# Patient Record
Sex: FEMALE | Race: WHITE | Employment: OTHER | URBAN - NONMETROPOLITAN AREA
[De-identification: names, ages, dates, MRNs, and addresses within clinical notes are randomized per-mention and may not be internally consistent; named-entity substitution may affect disease eponyms.]

---

## 2018-02-19 ENCOUNTER — HOSPITAL ENCOUNTER (OUTPATIENT)
Age: 73
Setting detail: SPECIMEN
Discharge: HOME | End: 2018-02-19
Payer: MEDICARE

## 2018-02-19 LAB
-: ABNORMAL
AMORPHOUS: ABNORMAL
BACTERIA: ABNORMAL
BILIRUBIN URINE: NEGATIVE
CASTS UA: ABNORMAL /LPF (ref 0–2)
COLOR: ABNORMAL
COMMENT UA: ABNORMAL
CRYSTALS, UA: ABNORMAL /HPF
EPITHELIAL CELLS UA: ABNORMAL /HPF (ref 0–5)
GLUCOSE URINE: NEGATIVE
KETONES, URINE: NEGATIVE
LEUKOCYTE ESTERASE, URINE: ABNORMAL
MUCUS: ABNORMAL
NITRITE, URINE: POSITIVE
OTHER OBSERVATIONS UA: ABNORMAL
PH UA: 5.5 (ref 5–6)
PROTEIN UA: NEGATIVE
RBC UA: ABNORMAL /HPF (ref 0–4)
RENAL EPITHELIAL, UA: ABNORMAL /HPF
SPECIFIC GRAVITY UA: 1.02 (ref 1.01–1.02)
TRICHOMONAS: ABNORMAL
TURBIDITY: ABNORMAL
URINE HGB: ABNORMAL
UROBILINOGEN, URINE: NORMAL
WBC UA: >100 /HPF (ref 0–4)
YEAST: ABNORMAL

## 2018-02-19 PROCEDURE — 87086 URINE CULTURE/COLONY COUNT: CPT

## 2018-02-19 PROCEDURE — 81001 URINALYSIS AUTO W/SCOPE: CPT

## 2018-02-19 PROCEDURE — 87077 CULTURE AEROBIC IDENTIFY: CPT

## 2018-02-19 PROCEDURE — 87186 SC STD MICRODIL/AGAR DIL: CPT

## 2018-02-20 LAB
CULTURE: ABNORMAL
CULTURE: ABNORMAL
Lab: ABNORMAL
ORGANISM: ABNORMAL
SPECIMEN DESCRIPTION: ABNORMAL
SPECIMEN DESCRIPTION: ABNORMAL
STATUS: ABNORMAL

## 2018-10-01 ENCOUNTER — OFFICE VISIT (OUTPATIENT)
Dept: UROLOGY | Age: 73
End: 2018-10-01
Payer: MEDICARE

## 2018-10-01 VITALS — SYSTOLIC BLOOD PRESSURE: 124 MMHG | OXYGEN SATURATION: 92 % | HEART RATE: 90 BPM | DIASTOLIC BLOOD PRESSURE: 70 MMHG

## 2018-10-01 DIAGNOSIS — N32.81 OAB (OVERACTIVE BLADDER): Primary | ICD-10-CM

## 2018-10-01 DIAGNOSIS — R32 INCONTINENCE IN FEMALE: ICD-10-CM

## 2018-10-01 PROCEDURE — G8484 FLU IMMUNIZE NO ADMIN: HCPCS | Performed by: UROLOGY

## 2018-10-01 PROCEDURE — G8421 BMI NOT CALCULATED: HCPCS | Performed by: UROLOGY

## 2018-10-01 PROCEDURE — 0509F URINE INCON PLAN DOCD: CPT | Performed by: UROLOGY

## 2018-10-01 PROCEDURE — 3017F COLORECTAL CA SCREEN DOC REV: CPT | Performed by: UROLOGY

## 2018-10-01 PROCEDURE — 1090F PRES/ABSN URINE INCON ASSESS: CPT | Performed by: UROLOGY

## 2018-10-01 PROCEDURE — 1101F PT FALLS ASSESS-DOCD LE1/YR: CPT | Performed by: UROLOGY

## 2018-10-01 PROCEDURE — 1036F TOBACCO NON-USER: CPT | Performed by: UROLOGY

## 2018-10-01 PROCEDURE — G8427 DOCREV CUR MEDS BY ELIG CLIN: HCPCS | Performed by: UROLOGY

## 2018-10-01 PROCEDURE — 4040F PNEUMOC VAC/ADMIN/RCVD: CPT | Performed by: UROLOGY

## 2018-10-01 PROCEDURE — 99204 OFFICE O/P NEW MOD 45 MIN: CPT | Performed by: UROLOGY

## 2018-10-01 PROCEDURE — G8400 PT W/DXA NO RESULTS DOC: HCPCS | Performed by: UROLOGY

## 2018-10-01 PROCEDURE — 1123F ACP DISCUSS/DSCN MKR DOCD: CPT | Performed by: UROLOGY

## 2018-10-01 RX ORDER — MONTELUKAST SODIUM 4 MG/1
TABLET, CHEWABLE ORAL PRN
COMMUNITY
Start: 2018-08-09

## 2018-10-01 RX ORDER — GLIMEPIRIDE 4 MG/1
TABLET ORAL
COMMUNITY
Start: 2018-03-20

## 2018-10-01 RX ORDER — GLUCOSAMINE HCL 500 MG
1000 TABLET ORAL
COMMUNITY

## 2018-10-01 RX ORDER — ATORVASTATIN CALCIUM 40 MG/1
TABLET, FILM COATED ORAL
COMMUNITY
Start: 2018-03-20

## 2018-10-01 RX ORDER — CLOPIDOGREL BISULFATE 75 MG/1
TABLET ORAL
Status: ON HOLD | COMMUNITY
Start: 2018-08-27 | End: 2019-03-11 | Stop reason: HOSPADM

## 2018-10-01 RX ORDER — CLONAZEPAM 1 MG/1
TABLET ORAL
COMMUNITY
Start: 2018-09-26

## 2018-10-01 RX ORDER — MEMANTINE HYDROCHLORIDE 5 MG/1
TABLET ORAL
COMMUNITY
Start: 2018-09-26

## 2018-10-01 RX ORDER — METFORMIN HYDROCHLORIDE 500 MG/1
TABLET, EXTENDED RELEASE ORAL
COMMUNITY
Start: 2018-01-25

## 2018-10-01 RX ORDER — LISINOPRIL 20 MG/1
TABLET ORAL
COMMUNITY
Start: 2018-08-27

## 2018-10-01 RX ORDER — TRAZODONE HYDROCHLORIDE 50 MG/1
TABLET ORAL
COMMUNITY
Start: 2018-09-27

## 2018-10-01 RX ORDER — PAROXETINE HYDROCHLORIDE 40 MG/1
TABLET, FILM COATED ORAL
COMMUNITY
Start: 2018-07-12

## 2018-10-01 RX ORDER — BUPROPION HYDROCHLORIDE 150 MG/1
TABLET, EXTENDED RELEASE ORAL
Status: ON HOLD | COMMUNITY
Start: 2018-03-15 | End: 2018-11-29

## 2018-10-01 RX ORDER — DONEPEZIL HYDROCHLORIDE 5 MG/1
TABLET, FILM COATED ORAL
COMMUNITY
Start: 2018-09-13

## 2018-10-22 ENCOUNTER — TELEPHONE (OUTPATIENT)
Dept: UROLOGY | Age: 73
End: 2018-10-22

## 2018-10-22 NOTE — TELEPHONE ENCOUNTER
Patient's  called the office this afternoon. They have been waiting to get scheduled for a procedure at [facility] [address]. V's.   Please call them back @  # [phone]

## 2018-10-22 NOTE — TELEPHONE ENCOUNTER
Spoke with patient's , waiting for a call from Jose Milton at Dr. Eric Ge office to schedule patient's urodynamincs

## 2018-10-23 ENCOUNTER — TELEPHONE (OUTPATIENT)
Dept: SURGERY | Age: 73
End: 2018-10-23

## 2018-10-29 DIAGNOSIS — N39.0 RECURRENT UTI: Primary | ICD-10-CM

## 2018-11-08 ENCOUNTER — HOSPITAL ENCOUNTER (OUTPATIENT)
Age: 73
Setting detail: SPECIMEN
Discharge: HOME | End: 2018-11-08
Payer: MEDICARE

## 2018-11-08 DIAGNOSIS — N39.0 RECURRENT UTI: ICD-10-CM

## 2018-11-08 PROCEDURE — 87086 URINE CULTURE/COLONY COUNT: CPT

## 2018-11-08 PROCEDURE — 87186 SC STD MICRODIL/AGAR DIL: CPT

## 2018-11-08 PROCEDURE — 87077 CULTURE AEROBIC IDENTIFY: CPT

## 2018-11-10 LAB
CULTURE: ABNORMAL
Lab: ABNORMAL
ORGANISM: ABNORMAL
SPECIMEN DESCRIPTION: ABNORMAL
STATUS: ABNORMAL

## 2018-11-12 DIAGNOSIS — N39.0 URINARY TRACT INFECTION WITHOUT HEMATURIA, SITE UNSPECIFIED: Primary | ICD-10-CM

## 2018-11-12 RX ORDER — CEPHALEXIN 500 MG/1
500 CAPSULE ORAL 3 TIMES DAILY
Qty: 21 CAPSULE | Refills: 0 | Status: SHIPPED | OUTPATIENT
Start: 2018-11-12 | End: 2018-12-12 | Stop reason: ALTCHOICE

## 2018-11-20 ENCOUNTER — HOSPITAL ENCOUNTER (OUTPATIENT)
Age: 73
Setting detail: SPECIMEN
Discharge: HOME | End: 2018-11-20
Payer: MEDICARE

## 2018-11-20 DIAGNOSIS — N39.0 URINARY TRACT INFECTION WITHOUT HEMATURIA, SITE UNSPECIFIED: ICD-10-CM

## 2018-11-20 DIAGNOSIS — N39.0 URINARY TRACT INFECTION WITHOUT HEMATURIA, SITE UNSPECIFIED: Primary | ICD-10-CM

## 2018-11-20 LAB
-: NORMAL
AMORPHOUS: NORMAL
BACTERIA: NORMAL
BILIRUBIN URINE: NEGATIVE
CASTS UA: NORMAL /LPF (ref 0–2)
COLOR: NORMAL
COMMENT UA: NORMAL
CRYSTALS, UA: NORMAL /HPF
EPITHELIAL CELLS UA: NORMAL /HPF (ref 0–5)
GLUCOSE URINE: NEGATIVE
KETONES, URINE: NEGATIVE
LEUKOCYTE ESTERASE, URINE: NEGATIVE
MUCUS: NORMAL
NITRITE, URINE: NEGATIVE
OTHER OBSERVATIONS UA: NORMAL
PH UA: 6 (ref 5–6)
PROTEIN UA: NEGATIVE
RBC UA: NORMAL /HPF (ref 0–4)
RENAL EPITHELIAL, UA: NORMAL /HPF
SPECIFIC GRAVITY UA: 1.01 (ref 1.01–1.02)
TRICHOMONAS: NORMAL
TURBIDITY: NORMAL
URINE HGB: NEGATIVE
UROBILINOGEN, URINE: NORMAL
WBC UA: NORMAL /HPF (ref 0–4)
YEAST: NORMAL

## 2018-11-20 PROCEDURE — 81001 URINALYSIS AUTO W/SCOPE: CPT

## 2018-11-20 PROCEDURE — 87086 URINE CULTURE/COLONY COUNT: CPT

## 2018-11-21 LAB
CULTURE: NORMAL
Lab: NORMAL
SPECIMEN DESCRIPTION: NORMAL
STATUS: NORMAL

## 2018-11-29 ENCOUNTER — HOSPITAL ENCOUNTER (OUTPATIENT)
Age: 73
Setting detail: OUTPATIENT SURGERY
Discharge: HOME | End: 2018-11-29
Attending: UROLOGY | Admitting: UROLOGY
Payer: MEDICARE

## 2018-11-29 VITALS
DIASTOLIC BLOOD PRESSURE: 90 MMHG | HEIGHT: 66 IN | HEART RATE: 95 BPM | WEIGHT: 230 LBS | SYSTOLIC BLOOD PRESSURE: 178 MMHG | RESPIRATION RATE: 20 BRPM | OXYGEN SATURATION: 92 % | BODY MASS INDEX: 36.96 KG/M2 | TEMPERATURE: 98.1 F

## 2018-11-29 LAB — GLUCOSE BLD-MCNC: 109 MG/DL (ref 65–105)

## 2018-11-29 PROCEDURE — 2580000003 HC RX 258: Performed by: UROLOGY

## 2018-11-29 PROCEDURE — 3600000013 HC SURGERY LEVEL 3 ADDTL 15MIN: Performed by: UROLOGY

## 2018-11-29 PROCEDURE — 7100000040 HC SPAR PHASE II RECOVERY - FIRST 15 MIN: Performed by: UROLOGY

## 2018-11-29 PROCEDURE — 6360000002 HC RX W HCPCS: Performed by: UROLOGY

## 2018-11-29 PROCEDURE — 2709999900 HC NON-CHARGEABLE SUPPLY: Performed by: UROLOGY

## 2018-11-29 PROCEDURE — 3600000003 HC SURGERY LEVEL 3 BASE: Performed by: UROLOGY

## 2018-11-29 PROCEDURE — 82947 ASSAY GLUCOSE BLOOD QUANT: CPT

## 2018-11-29 RX ORDER — MAGNESIUM HYDROXIDE 1200 MG/15ML
LIQUID ORAL CONTINUOUS PRN
Status: DISCONTINUED | OUTPATIENT
Start: 2018-11-29 | End: 2018-11-29 | Stop reason: HOSPADM

## 2018-11-29 ASSESSMENT — PAIN - FUNCTIONAL ASSESSMENT: PAIN_FUNCTIONAL_ASSESSMENT: 0-10

## 2018-11-29 ASSESSMENT — PAIN SCALES - GENERAL: PAINLEVEL_OUTOF10: 0

## 2018-11-29 NOTE — H&P
Eleni Jolley MD  History and Physical    Patient:  Tanya Cuenca  MRN: [id number]  YOB: 1945    HISTORY OF PRESENT ILLNESS:     The patient is a 75 y.o. female who presents with urodynamics. Here for procedure. Patient's old records, notes and chart reviewed and summarized above. Eleni Jolley MD independently reviewed the images and verified the radiology reports from:    No results found. Past Medical History:    Past Medical History:   Diagnosis Date    DMII (diabetes mellitus, type 2) ([facility] [address]     Hypertension     Sleep apnea        Past Surgical History:    Past Surgical History:   Procedure Laterality Date    COLONOSCOPY      CYSTOSCOPY      DENTAL SURGERY      EYE SURGERY         Medications Prior to Admission:    Prior to Admission medications    Medication Sig Start Date End Date Taking? Authorizing Provider   atorvastatin (LIPITOR) 40 MG tablet TAKE ONE-HALF TABLET BY MOUTH ONCE DAILY 3/20/18  Yes Historical Provider, MD   clonazePAM (KLONOPIN) 1 MG tablet TAKE 1 TABLET BY MOUTH EVERY 8 HOURS 9/26/18  Yes Historical Provider, MD   clopidogrel (PLAVIX) 75 MG tablet TAKE 1 TABLET BY MOUTH ONCE DAILY 8/27/18  Yes Historical Provider, MD   memantine (NAMENDA) 5 MG tablet TAKE 1 TABLET BY MOUTH TWICE DAILY 9/26/18  Yes Historical Provider, MD   lisinopril (PRINIVIL;ZESTRIL) 20 MG tablet TAKE 1 TABLET BY MOUTH ONCE DAILY 8/27/18  Yes Historical Provider, MD   metFORMIN (GLUCOPHAGE-XR) 500 MG extended release tablet 2 po bid 1/25/18  Yes Historical Provider, MD   PARoxetine (PAXIL) 40 MG tablet TAKE 1/2 (ONE-HALF) TABLET BY MOUTH ONCE DAILY FOR 6 DAYS.  THEN TAKE 1 TABLET ONCE DAILY THEREAFTER 7/12/18  Yes Historical Provider, MD   colestipol (COLESTID) 1 g tablet ONE TABLET BY MOUTH THREE TIMES A DAY  MAY TAKE 4 TIMES A DAY AS NEEDED FOR DIARREA 8/9/18  Yes Historical Provider, MD   donepezil (ARICEPT) 5 MG tablet TAKE 1 TABLET BY MOUTH AT BEDTIME 9/13/18  Yes Historical Provider,

## 2018-11-29 NOTE — PROGRESS NOTES
Dr Yaquelin Martinez in to visit notified of post op assessment and o2 sats to f/u with pulmonary doctor

## 2018-12-04 DIAGNOSIS — R32 URINARY INCONTINENCE, UNSPECIFIED TYPE: Primary | ICD-10-CM

## 2018-12-06 DIAGNOSIS — R09.02 HYPOXIA: Primary | ICD-10-CM

## 2018-12-11 DIAGNOSIS — R09.02 HYPOXEMIA: Primary | ICD-10-CM

## 2018-12-12 ENCOUNTER — HOSPITAL ENCOUNTER (OUTPATIENT)
Dept: PULMONOLOGY | Age: 73
Discharge: HOME | End: 2018-12-12
Payer: MEDICARE

## 2018-12-12 ENCOUNTER — OFFICE VISIT (OUTPATIENT)
Dept: PULMONOLOGY | Age: 73
End: 2018-12-12
Payer: MEDICARE

## 2018-12-12 ENCOUNTER — HOSPITAL ENCOUNTER (OUTPATIENT)
Dept: GENERAL RADIOLOGY | Age: 73
Discharge: HOME | End: 2018-12-14
Payer: MEDICARE

## 2018-12-12 VITALS
HEIGHT: 66 IN | OXYGEN SATURATION: 93 % | DIASTOLIC BLOOD PRESSURE: 82 MMHG | SYSTOLIC BLOOD PRESSURE: 126 MMHG | HEART RATE: 82 BPM | BODY MASS INDEX: 36.95 KG/M2 | WEIGHT: 229.94 LBS

## 2018-12-12 DIAGNOSIS — E11.9 TYPE 2 DIABETES MELLITUS WITHOUT COMPLICATION, WITHOUT LONG-TERM CURRENT USE OF INSULIN (HCC): ICD-10-CM

## 2018-12-12 DIAGNOSIS — Z99.89 OSA ON CPAP: Primary | ICD-10-CM

## 2018-12-12 DIAGNOSIS — R15.9 BOWEL AND BLADDER INCONTINENCE: ICD-10-CM

## 2018-12-12 DIAGNOSIS — Z87.891 STOPPED SMOKING WITH GREATER THAN 10 PACK YEAR HISTORY: ICD-10-CM

## 2018-12-12 DIAGNOSIS — J44.9 COPD, SEVERITY TO BE DETERMINED (HCC): ICD-10-CM

## 2018-12-12 DIAGNOSIS — R32 BOWEL AND BLADDER INCONTINENCE: ICD-10-CM

## 2018-12-12 DIAGNOSIS — E66.01 CLASS 2 SEVERE OBESITY DUE TO EXCESS CALORIES WITH SERIOUS COMORBIDITY AND BODY MASS INDEX (BMI) OF 37.0 TO 37.9 IN ADULT (HCC): ICD-10-CM

## 2018-12-12 DIAGNOSIS — R09.02 HYPOXEMIA: ICD-10-CM

## 2018-12-12 DIAGNOSIS — G47.33 OSA ON CPAP: Primary | ICD-10-CM

## 2018-12-12 DIAGNOSIS — R91.1 ABNORMAL X-RAY OF LUNGS WITH SINGLE PULMONARY NODULE: ICD-10-CM

## 2018-12-12 DIAGNOSIS — J96.11 CHRONIC RESPIRATORY FAILURE WITH HYPOXIA (HCC): ICD-10-CM

## 2018-12-12 PROCEDURE — 3017F COLORECTAL CA SCREEN DOC REV: CPT | Performed by: INTERNAL MEDICINE

## 2018-12-12 PROCEDURE — 6360000002 HC RX W HCPCS: Performed by: INTERNAL MEDICINE

## 2018-12-12 PROCEDURE — 94726 PLETHYSMOGRAPHY LUNG VOLUMES: CPT

## 2018-12-12 PROCEDURE — 2022F DILAT RTA XM EVC RTNOPTHY: CPT | Performed by: INTERNAL MEDICINE

## 2018-12-12 PROCEDURE — 94640 AIRWAY INHALATION TREATMENT: CPT

## 2018-12-12 PROCEDURE — G8427 DOCREV CUR MEDS BY ELIG CLIN: HCPCS | Performed by: INTERNAL MEDICINE

## 2018-12-12 PROCEDURE — 3023F SPIROM DOC REV: CPT | Performed by: INTERNAL MEDICINE

## 2018-12-12 PROCEDURE — G8926 SPIRO NO PERF OR DOC: HCPCS | Performed by: INTERNAL MEDICINE

## 2018-12-12 PROCEDURE — 1090F PRES/ABSN URINE INCON ASSESS: CPT | Performed by: INTERNAL MEDICINE

## 2018-12-12 PROCEDURE — G8400 PT W/DXA NO RESULTS DOC: HCPCS | Performed by: INTERNAL MEDICINE

## 2018-12-12 PROCEDURE — 99205 OFFICE O/P NEW HI 60 MIN: CPT | Performed by: INTERNAL MEDICINE

## 2018-12-12 PROCEDURE — 71046 X-RAY EXAM CHEST 2 VIEWS: CPT

## 2018-12-12 PROCEDURE — G8417 CALC BMI ABV UP PARAM F/U: HCPCS | Performed by: INTERNAL MEDICINE

## 2018-12-12 PROCEDURE — 1101F PT FALLS ASSESS-DOCD LE1/YR: CPT | Performed by: INTERNAL MEDICINE

## 2018-12-12 PROCEDURE — 0509F URINE INCON PLAN DOCD: CPT | Performed by: INTERNAL MEDICINE

## 2018-12-12 PROCEDURE — G8484 FLU IMMUNIZE NO ADMIN: HCPCS | Performed by: INTERNAL MEDICINE

## 2018-12-12 PROCEDURE — 1036F TOBACCO NON-USER: CPT | Performed by: INTERNAL MEDICINE

## 2018-12-12 PROCEDURE — 3046F HEMOGLOBIN A1C LEVEL >9.0%: CPT | Performed by: INTERNAL MEDICINE

## 2018-12-12 PROCEDURE — 4040F PNEUMOC VAC/ADMIN/RCVD: CPT | Performed by: INTERNAL MEDICINE

## 2018-12-12 PROCEDURE — 94729 DIFFUSING CAPACITY: CPT

## 2018-12-12 PROCEDURE — 1123F ACP DISCUSS/DSCN MKR DOCD: CPT | Performed by: INTERNAL MEDICINE

## 2018-12-12 PROCEDURE — 94060 EVALUATION OF WHEEZING: CPT

## 2018-12-12 RX ORDER — ALBUTEROL SULFATE 2.5 MG/3ML
2.5 SOLUTION RESPIRATORY (INHALATION) ONCE
Status: COMPLETED | OUTPATIENT
Start: 2018-12-12 | End: 2018-12-12

## 2018-12-12 RX ADMIN — ALBUTEROL SULFATE 2.5 MG: 2.5 SOLUTION RESPIRATORY (INHALATION) at 15:49

## 2018-12-12 ASSESSMENT — ENCOUNTER SYMPTOMS
EYES NEGATIVE: 1
APNEA: 1
GASTROINTESTINAL NEGATIVE: 1
ALLERGIC/IMMUNOLOGIC NEGATIVE: 1
SHORTNESS OF BREATH: 1

## 2018-12-12 NOTE — PROGRESS NOTES
normal.   Left Ear: External ear normal.   Nose: Nose normal.   Mouth/Throat: Oropharynx is clear and moist. No oropharyngeal exudate. Large tongue , low hanging soft palate and large uvula. Overall pharyngeal orifice moderately decreased     Eyes: Conjunctivae are normal. No scleral icterus. Neck: Neck supple. No JVD present. No tracheal deviation present. No thyromegaly present. Cardiovascular: Normal rate, regular rhythm and normal heart sounds. Exam reveals no gallop. No murmur heard. Pulmonary/Chest: Effort normal. No respiratory distress. She has no wheezes. She has no rales. She exhibits no tenderness. Abdominal: Soft. There is no tenderness. Musculoskeletal: She exhibits no edema. Lymphadenopathy:     She has no cervical adenopathy. Neurological: She is alert and oriented to person, place, and time. Skin: Skin is warm and dry. Nursing note and vitals reviewed. Wt Readings from Last 3 Encounters:   12/12/18 229 lb 15 oz (104.3 kg)   11/29/18 230 lb (104.3 kg)       Results for orders placed or performed during the hospital encounter of 11/29/18   POC Glucose Fingerstick   Result Value Ref Range    POC Glucose 109 (H) 65 - 105 mg/dL       Assessment:      1. VÍCTOR on CPAP    2. COPD, severity to be determined ([address]    3. Stopped smoking with greater than 10 pack year history    4. Class 2 severe obesity due to excess calories with serious comorbidity and body mass index (BMI) of 37.0 to 37.9 in adult ([address]    5. Bowel and bladder incontinence    6. Type 2 diabetes mellitus without complication, without long-term current use of insulin (HCC)    7. Abnormal x-ray of lungs with single pulmonary nodule    8. Chronic respiratory failure with hypoxia (HCC)        Plan:      1. Pulmonary function studies. 2. Low dose diagnostic CT scan of the chest.  3. Nocturnal oximetry study on CPAP and oxygen 4 L a minute. 4. Continue CPAP. 5. Avoid sedative hypnotics and alcohol at bedtime.   6. Final

## 2018-12-13 NOTE — PROCEDURES
[address]                 [address] [place] [address]                               PULMONARY FUNCTION    PATIENT NAME: Arun Edmondson                       :        1945  MED REC NO:   [id number]                             ROOM:  ACCOUNT NO:   [de-identified]                           ADMIT DATE: 2018  PROVIDER:     Jonnathan Madrigal    DATE OF PROCEDURE:  2018    INTERPRETATION:  The study demonstrates a mild obstructive ventilatory  defect. No significant bronchospastic component is identified. Lung  volumes were not performed. Diffusing capacity is moderately decreased. IMPRESSION:  The study is consistent with a clinical diagnosis of mild  COPD.         Kevin Solomon    D: 2018 17:52:41       T: 2018 1:41:59     [id number]  Job#: [id number]     Doc#: [id number]    CC:  Zari Rai

## 2018-12-18 ENCOUNTER — HOSPITAL ENCOUNTER (OUTPATIENT)
Dept: CT IMAGING | Age: 73
Discharge: HOME | End: 2018-12-20
Payer: MEDICARE

## 2018-12-18 DIAGNOSIS — J44.9 COPD, SEVERITY TO BE DETERMINED (HCC): ICD-10-CM

## 2018-12-18 DIAGNOSIS — R91.1 ABNORMAL X-RAY OF LUNGS WITH SINGLE PULMONARY NODULE: ICD-10-CM

## 2018-12-18 PROCEDURE — 71250 CT THORAX DX C-: CPT

## 2019-01-17 ENCOUNTER — TELEPHONE (OUTPATIENT)
Dept: UROLOGY | Age: 74
End: 2019-01-17

## 2019-02-05 ENCOUNTER — TELEPHONE (OUTPATIENT)
Dept: UROLOGY | Age: 74
End: 2019-02-05

## 2019-03-04 ENCOUNTER — ANESTHESIA EVENT (OUTPATIENT)
Dept: OPERATING ROOM | Age: 74
End: 2019-03-04
Payer: MEDICARE

## 2019-03-04 ENCOUNTER — HOSPITAL ENCOUNTER (OUTPATIENT)
Age: 74
Setting detail: OUTPATIENT SURGERY
Discharge: HOME | End: 2019-03-04
Attending: UROLOGY | Admitting: UROLOGY
Payer: MEDICARE

## 2019-03-04 ENCOUNTER — ANESTHESIA (OUTPATIENT)
Dept: OPERATING ROOM | Age: 74
End: 2019-03-04
Payer: MEDICARE

## 2019-03-04 ENCOUNTER — APPOINTMENT (OUTPATIENT)
Dept: GENERAL RADIOLOGY | Age: 74
End: 2019-03-04
Attending: UROLOGY
Payer: MEDICARE

## 2019-03-04 VITALS
OXYGEN SATURATION: 92 % | DIASTOLIC BLOOD PRESSURE: 78 MMHG | SYSTOLIC BLOOD PRESSURE: 148 MMHG | RESPIRATION RATE: 16 BRPM

## 2019-03-04 VITALS
DIASTOLIC BLOOD PRESSURE: 68 MMHG | TEMPERATURE: 98.5 F | RESPIRATION RATE: 16 BRPM | HEIGHT: 66 IN | OXYGEN SATURATION: 94 % | HEART RATE: 81 BPM | WEIGHT: 246.8 LBS | BODY MASS INDEX: 39.66 KG/M2 | SYSTOLIC BLOOD PRESSURE: 146 MMHG

## 2019-03-04 DIAGNOSIS — G89.18 POSTOPERATIVE PAIN: Primary | ICD-10-CM

## 2019-03-04 LAB — GLUCOSE BLD-MCNC: 142 MG/DL (ref 65–105)

## 2019-03-04 PROCEDURE — 2709999900 HC NON-CHARGEABLE SUPPLY: Performed by: UROLOGY

## 2019-03-04 PROCEDURE — 00630 ANES PX LUMBAR REGION NOS: CPT | Performed by: NURSE ANESTHETIST, CERTIFIED REGISTERED

## 2019-03-04 PROCEDURE — 7100000010 HC PHASE II RECOVERY - FIRST 15 MIN: Performed by: UROLOGY

## 2019-03-04 PROCEDURE — 3700000001 HC ADD 15 MINUTES (ANESTHESIA): Performed by: UROLOGY

## 2019-03-04 PROCEDURE — 3700000000 HC ANESTHESIA ATTENDED CARE: Performed by: UROLOGY

## 2019-03-04 PROCEDURE — 6360000002 HC RX W HCPCS

## 2019-03-04 PROCEDURE — 6360000002 HC RX W HCPCS: Performed by: NURSE ANESTHETIST, CERTIFIED REGISTERED

## 2019-03-04 PROCEDURE — 2500000003 HC RX 250 WO HCPCS

## 2019-03-04 PROCEDURE — C1883 ADAPT/EXT, PACING/NEURO LEAD: HCPCS | Performed by: UROLOGY

## 2019-03-04 PROCEDURE — 3209999900 FLUORO FOR SURGICAL PROCEDURES

## 2019-03-04 PROCEDURE — C1894 INTRO/SHEATH, NON-LASER: HCPCS | Performed by: UROLOGY

## 2019-03-04 PROCEDURE — 7100000011 HC PHASE II RECOVERY - ADDTL 15 MIN: Performed by: UROLOGY

## 2019-03-04 PROCEDURE — 2580000003 HC RX 258: Performed by: UROLOGY

## 2019-03-04 PROCEDURE — C1897 LEAD, NEUROSTIM TEST KIT: HCPCS | Performed by: UROLOGY

## 2019-03-04 PROCEDURE — 2500000003 HC RX 250 WO HCPCS: Performed by: NURSE ANESTHETIST, CERTIFIED REGISTERED

## 2019-03-04 PROCEDURE — 3600000003 HC SURGERY LEVEL 3 BASE: Performed by: UROLOGY

## 2019-03-04 PROCEDURE — 82947 ASSAY GLUCOSE BLOOD QUANT: CPT

## 2019-03-04 PROCEDURE — 3600000013 HC SURGERY LEVEL 3 ADDTL 15MIN: Performed by: UROLOGY

## 2019-03-04 DEVICE — KIT NEUROSTIMULATOR LD L28CM DIA1.27MM ELECTRD SPC 1.5MM: Type: IMPLANTABLE DEVICE | Site: BACK | Status: FUNCTIONAL

## 2019-03-04 DEVICE — CABLE NEUROSTIMULATOR TWST LOK GLOB INTERSTIM: Type: IMPLANTABLE DEVICE | Site: BACK | Status: FUNCTIONAL

## 2019-03-04 DEVICE — NEUROSTIMULATOR EXT SM LTWT SGL BTTN H2O RESIST WIRELESS: Type: IMPLANTABLE DEVICE | Site: BACK | Status: FUNCTIONAL

## 2019-03-04 RX ORDER — PROPOFOL 10 MG/ML
INJECTION, EMULSION INTRAVENOUS PRN
Status: DISCONTINUED | OUTPATIENT
Start: 2019-03-04 | End: 2019-03-04 | Stop reason: SDUPTHER

## 2019-03-04 RX ORDER — SODIUM CHLORIDE, SODIUM LACTATE, POTASSIUM CHLORIDE, CALCIUM CHLORIDE 600; 310; 30; 20 MG/100ML; MG/100ML; MG/100ML; MG/100ML
INJECTION, SOLUTION INTRAVENOUS CONTINUOUS
Status: DISCONTINUED | OUTPATIENT
Start: 2019-03-04 | End: 2019-03-04 | Stop reason: HOSPADM

## 2019-03-04 RX ORDER — FENTANYL CITRATE 50 UG/ML
INJECTION, SOLUTION INTRAMUSCULAR; INTRAVENOUS PRN
Status: DISCONTINUED | OUTPATIENT
Start: 2019-03-04 | End: 2019-03-04 | Stop reason: SDUPTHER

## 2019-03-04 RX ORDER — CEPHALEXIN 500 MG/1
500 CAPSULE ORAL 3 TIMES DAILY
Qty: 15 CAPSULE | Refills: 0 | Status: SHIPPED | OUTPATIENT
Start: 2019-03-04 | End: 2019-03-09

## 2019-03-04 RX ORDER — SODIUM CHLORIDE 0.9 % (FLUSH) 0.9 %
10 SYRINGE (ML) INJECTION PRN
Status: DISCONTINUED | OUTPATIENT
Start: 2019-03-04 | End: 2019-03-04 | Stop reason: HOSPADM

## 2019-03-04 RX ORDER — LIDOCAINE HYDROCHLORIDE 20 MG/ML
INJECTION, SOLUTION EPIDURAL; INFILTRATION; INTRACAUDAL; PERINEURAL PRN
Status: DISCONTINUED | OUTPATIENT
Start: 2019-03-04 | End: 2019-03-04 | Stop reason: SDUPTHER

## 2019-03-04 RX ORDER — HYDROCODONE BITARTRATE AND ACETAMINOPHEN 5; 325 MG/1; MG/1
1 TABLET ORAL EVERY 4 HOURS PRN
Qty: 18 TABLET | Refills: 0 | Status: SHIPPED | OUTPATIENT
Start: 2019-03-04 | End: 2019-03-07

## 2019-03-04 RX ORDER — PROPOFOL 10 MG/ML
INJECTION, EMULSION INTRAVENOUS CONTINUOUS PRN
Status: DISCONTINUED | OUTPATIENT
Start: 2019-03-04 | End: 2019-03-04 | Stop reason: SDUPTHER

## 2019-03-04 RX ORDER — SODIUM CHLORIDE 0.9 % (FLUSH) 0.9 %
10 SYRINGE (ML) INJECTION EVERY 12 HOURS SCHEDULED
Status: DISCONTINUED | OUTPATIENT
Start: 2019-03-04 | End: 2019-03-04 | Stop reason: HOSPADM

## 2019-03-04 RX ADMIN — LIDOCAINE HYDROCHLORIDE 50 MG: 20 INJECTION, SOLUTION EPIDURAL; INFILTRATION; INTRACAUDAL; PERINEURAL at 12:39

## 2019-03-04 RX ADMIN — Medication 2 G: at 12:42

## 2019-03-04 RX ADMIN — FENTANYL CITRATE 12.5 MCG: 50 INJECTION, SOLUTION INTRAMUSCULAR; INTRAVENOUS at 12:56

## 2019-03-04 RX ADMIN — FENTANYL CITRATE 25 MCG: 50 INJECTION, SOLUTION INTRAMUSCULAR; INTRAVENOUS at 12:41

## 2019-03-04 RX ADMIN — SODIUM CHLORIDE, SODIUM LACTATE, POTASSIUM CHLORIDE, AND CALCIUM CHLORIDE: .6; .31; .03; .02 INJECTION, SOLUTION INTRAVENOUS at 12:12

## 2019-03-04 RX ADMIN — PROPOFOL 50 MCG/KG/MIN: 10 INJECTION, EMULSION INTRAVENOUS at 12:44

## 2019-03-04 RX ADMIN — FENTANYL CITRATE 12.5 MCG: 50 INJECTION, SOLUTION INTRAMUSCULAR; INTRAVENOUS at 13:03

## 2019-03-04 RX ADMIN — PROPOFOL 50 MG: 10 INJECTION, EMULSION INTRAVENOUS at 12:39

## 2019-03-04 ASSESSMENT — PAIN - FUNCTIONAL ASSESSMENT: PAIN_FUNCTIONAL_ASSESSMENT: 0-10

## 2019-03-04 ASSESSMENT — PAIN SCALES - GENERAL
PAINLEVEL_OUTOF10: 0

## 2019-03-04 NOTE — H&P
Cali Soto MD  History and Physical    Patient:  Larry Jensen  MRN: [id number]  YOB: 1945    HISTORY OF PRESENT ILLNESS:     The patient is a 75 y.o. female who presents with OAB. Here for procedure. Patient's old records, notes and chart reviewed and summarized above. Cali Soto MD independently reviewed the images and verified the radiology reports from:    Ct Chest Low Dose    Result Date: 12/18/2018  EXAMINATION: LOW DOSE OF THE CT  CHEST WITHOUT CONTRAST 12/18/2018 3:37 pm TECHNIQUE: Low Dose of the CT Chest without the administration of intravenous contrast (MA=40). Multiplanar reformatted images are provided for review. Dose modulation, iterative reconstruction, and/or weight based adjustment of the mA/kV was utilized to reduce the radiation dose to as low as reasonably achievable. COMPARISON: Chest x-ray December 12, 2018. HISTORY: ORDERING SYSTEM PROVIDED HISTORY: COPD, severity to be determined [facility]) TECHNOLOGIST PROVIDED HISTORY: vague density on lat CXR; exclude nodule. Low risk factor profile Ordering Physician Provided Reason for Exam: F/u lung nodule Acuity: Chronic Type of Exam: Subsequent/Follow-up FINDINGS: Mediastinum: Suboptimal evaluation due to low dose technique. Thoracic aorta demonstrates mild calcification without aneurysm. Pulmonary trunk appears nondilated. Heart appears normal size without pericardial effusion. No lymphadenopathy is noted. The esophagus is grossly unremarkable. Lungs/pleura: Mild dependent atelectatic changes. No focal lung consolidation, pneumothorax or pleural effusion. Trachea and distal airways appear patent. No suspicious noncalcified lung nodule or mass. Upper Abdomen: Suboptimal evaluation due to low dose technique. Visualized adrenal glands appear grossly unremarkable. Soft Tissues/Bones: Visualized soft tissues surrounding the chest wall demonstrate no acute findings. Osseous structures demonstrate degenerative changes. 1. No acute cardiopulmonary process. No suspicious noncalcified lung nodule or mass. Past Medical History:    Past Medical History:   Diagnosis Date    DMII (diabetes mellitus, type 2) ([address]     Hypertension     Sleep apnea        Past Surgical History:    Past Surgical History:   Procedure Laterality Date    COLONOSCOPY      CYSTOSCOPY      DENTAL SURGERY      EYE SURGERY      SC CYSTOURETHROSCOPY N/A 11/29/2018    CYSTOSCOPY performed by Amrit Snow MD at [facility] OFFICE/OUTPT [facility] N/A 11/29/2018    URODYNAMICS performed by Amrit Snow MD at [place] [address]       Medications Prior to Admission:    Prior to Admission medications    Medication Sig Start Date End Date Taking? Authorizing Provider   Multiple Vitamin (MULTI-VITAMIN DAILY PO) Take by mouth    Historical Provider, MD   atorvastatin (LIPITOR) 40 MG tablet TAKE ONE-HALF TABLET BY MOUTH ONCE DAILY 3/20/18   Historical Provider, MD   Cholecalciferol (VITAMIN D3) 3000 units TABS Take 1,000 Units by mouth    Historical Provider, MD   clonazePAM (KLONOPIN) 1 MG tablet TAKE 1 TABLET BY MOUTH EVERY 8 HOURS 9/26/18   Historical Provider, MD   clopidogrel (PLAVIX) 75 MG tablet TAKE 1 TABLET BY MOUTH ONCE DAILY 8/27/18   Historical Provider, MD   memantine (NAMENDA) 5 MG tablet TAKE 1 TABLET BY MOUTH TWICE DAILY 9/26/18   Historical Provider, MD   lisinopril (PRINIVIL;ZESTRIL) 20 MG tablet TAKE 1 TABLET BY MOUTH ONCE DAILY 8/27/18   Historical Provider, MD   metFORMIN (GLUCOPHAGE-XR) 500 MG extended release tablet 2 po bid 1/25/18   Historical Provider, MD   PARoxetine (PAXIL) 40 MG tablet TAKE 1/2 (ONE-HALF) TABLET BY MOUTH ONCE DAILY FOR 6 DAYS.  THEN TAKE 1 TABLET ONCE DAILY THEREAFTER 7/12/18   Historical Provider, MD   colestipol (COLESTID) 1 g tablet ONE TABLET BY MOUTH THREE TIMES A DAY  MAY TAKE 4 TIMES A DAY AS NEEDED FOR DIARREA 8/9/18   Historical Provider, MD   donepezil (ARICEPT) 5 MG tablet TAKE 1 TABLET BY MOUTH AT BEDTIME 9/13/18   Historical Provider, MD   glimepiride (AMARYL) 4 MG tablet TAKE ONE TABLET BY MOUTH TWICE DAILY 3/20/18   Historical Provider, MD   traZODone (DESYREL) 50 MG tablet TAKE 1 TABLET BY MOUTH NIGHTLY 9/27/18   Historical Provider, MD       Allergies:  Sulfa antibiotics    Social History:    Social History     Socioeconomic History    Marital status:      Spouse name: Not on file    Number of children: Not on file    Years of education: Not on file    Highest education level: Not on file   Occupational History    Not on file   Social Needs    Financial resource strain: Not on file    Food insecurity:     Worry: Not on file     Inability: Not on file    Transportation needs:     Medical: Not on file     Non-medical: Not on file   Tobacco Use    Smoking status: Never Smoker    Smokeless tobacco: Never Used   Substance and Sexual Activity    Alcohol use: Not on file    Drug use: Not on file    Sexual activity: Not on file   Lifestyle    Physical activity:     Days per week: Not on file     Minutes per session: Not on file    Stress: Not on file   Relationships    Social connections:     Talks on phone: Not on file     Gets together: Not on file     Attends [employer] service: Not on file     Active member of club or organization: Not on file     Attends meetings of clubs or organizations: Not on file     Relationship status: Not on file    Intimate partner violence:     Fear of current or ex partner: Not on file     Emotionally abused: Not on file     Physically abused: Not on file     Forced sexual activity: Not on file   Other Topics Concern    Not on file   Social History Narrative    Not on file       Family History:  No family history on file.     REVIEW OF SYSTEMS:  Constitutional: negative  Eyes: negative  Respiratory: negative  Cardiovascular: negative  Gastrointestinal: negative  Genitourinary: no acute issues  Musculoskeletal: negative  Skin: negative   Neurological: negative  Hematological/Lymphatic: negative  Psychological: negative    Physical Exam:      No data found. Constitutional: Patient in no acute distress; Neuro: alert and oriented to person place and time. Psych: Mood and affect normal.  Skin: Normal  Lungs: Respiratory effort normal, CTA  Cardiovascular:  Normal peripheral pulses; no murmur. Normal rhythm  Abdomen: Soft, non-tender, non-distended with no CVA, flank pain, hepatosplenomegaly or hernia. Kidneys normal.  Bladder non-tender and not distended. LABS:   No results for input(s): WBC, HGB, HCT, MCV, PLT in the last 72 hours. No results for input(s): NA, K, CL, CO2, PHOS, BUN, CREATININE in the last 72 hours. Invalid input(s): CA  No results found for: PSA      Urinalysis: No results for input(s): COLORU, PHUR, LABCAST, WBCUA, RBCUA, MUCUS, TRICHOMONAS, YEAST, BACTERIA, CLARITYU, SPECGRAV, LEUKOCYTESUR, UROBILINOGEN, Mik Mediate in the last 72 hours.     Invalid input(s): NITRATE, GLUCOSEUKETONESUAMORPHOUS     -----------------------------------------------------------------      Assessment and Plan     Impression:    OAB      Plan:     Consent obtained; SNS stage I in OR today    Deepa Jo MD  10:13 AM 3/4/2019

## 2019-03-05 NOTE — OP NOTE
Patient:  Chip Cesar  MRN: [id number]  YOB: 1945    FACILITY: [facility] 3/4/2019    SURGEON: Kailash Ding MD     ASSISTANT:  none    PREOPERATIVE DIAGNOSIS: overactive bladder    POSTOPERATIVE DIAGNOSIS:  Overactive bladder    PROCEDURE PERFORMED:    1. Interstim Sacral Neuromodulation System Placement, Stage 1    ANESTHESIA: Monitor Anesthesia Care    ESTIMATED BLOOD LOSS: * No values recorded between 3/4/2019 12:27 PM and 3/4/2019  5:76 PM *     COMPLICATIONS: None immediate    DRAINS:  none    SPECIMENS: * No specimens in log *    INDICATIONS FOR PROCEDURE:  The patient is a 75 y.o. female who presents with urinary frequency, urgency and urge incontinence which has been refractory to anticholinergic medications and has been very bothersome. She was given the option of third line management, which was either a Botox injection or neuromodulation. The risks and benefits of treatment as well as all available alternatives and complications were discussed and she wished to undergo interstim placement. Narrative of the Procedure: The patient was properly identified and placed in prone position. Pillows were placed under lower abdomen to flatten sacrum and under shins to allow the toes to dangle freely. A ground pad was placed on the bottom of the patients foot and the long test stimulation cable was connected to the ground pad and the external test stimulator. The patient was prepped and draped in usual sterile fashion. A timeout occurred in which two patient identifiers were used. The sciatic notches and sacral midline were identified using fluoroscopy. Local injection was administered around the anticipated foramen needle entry point which was 2cm lateral to the sacral midline and 2cm cephalad of sciatic notch level. A foramen needle was introduced at an approximate 60 degree angle, feeling for the foraminal margins until S3 was identified.  Proper needle position was confirmed by stimulating the needle and observing a bellowing response, and also plantar flexion of the big toe. In addition, fluoroscopic images demonstrated appropriate placement. The foramen needle stylet was removed and a directional guide was placed through the needle. The foramen needle was removed by sliding it over the directional guide. A small incision was made next to the directional guide through the skin with a 15-blade knife. The lead introducer with dilator was placed over the directional guide. Utilizing fluoroscopic guidance the lead introducer was advanced until the radiopaque tasha was half-way through the foramen. The dilator was removed along with the directional guide. Using fluoroscopy, the tined lead with the angled stylet was placed through the introducer until electrodes two and three straddled the anterior edge of the sacrum. All four electrodes were tested, observing cristine and plantar flexion of the great toe. All 4 electrodes demonstrated excellent responses. After satisfactory lead positioning was confirmed, the introducer was retracted over the lead under continuous fluoroscopy, deploying the tines into the presacral tissues. Re-testing after removal of the introducer re-confirmed the aforementioned responses. The potential internal neurostimulator pocket site was identified on the patient's right side below the iliac crest and lateral to the sacrum. Local was administered and an incision was made into the subcutaneous tissue creating a connection site. Blunt dissection was used to create a small pocket with hemostasis achieved. A tunneling tool with sheath was placed from the lead exit site subcutaneously to the small incised pocket site. The tunneling tool was removed and the lead was fed through the sheath, exiting at the pocket site. The sheath was removed. The lead was cleaned and dried.  A protective boot was placed over the lead; the lead was inserted into the temporary percutaneous extension with visual confirmation of blue tip advancement. The four sets-crews were tightened with the torque wrench until audible clicks were heard. The boot was slid over the connection and 2-0 prolene ties were used to secure the boot in place. Using the tunneling tool and sheath, a subcutaneous tunnel was created from the pocket site to the contralateral buttock. The percutaneous extension was placed through the sheath, the sheath removed, leaving the extension exiting the site. The connection components were placed into the incised pocket. The incisions and pocket were irrigated and closed. The dermis was re-approximated with 2-0 Vicryl. The skin was closed with a 4-0 Monocryl in a subcuticular manner. The percutaneous extension was attached to the external gray twist-lock cable. The patient was awakened and transferred to the PACU in good and stable condition. In the PACU, using the external test stimulator, the patient was programmed to the electrode of optimum sensation and provided utilization instructions prior to discharge. Follow-Up: Patient will complete a voiding diary during the testing period. Should they have an appropriate response, the patient will present for a stage 2 procedure in the coming weeks. In addition, the patient will be discharged on 5 days of Keflex.        [facility]URINDER [facility]  Electronically signed on 3/4/2019 at 8:57 PM

## 2019-03-11 ENCOUNTER — ANESTHESIA EVENT (OUTPATIENT)
Dept: OPERATING ROOM | Age: 74
End: 2019-03-11
Payer: MEDICARE

## 2019-03-11 ENCOUNTER — HOSPITAL ENCOUNTER (OUTPATIENT)
Age: 74
Setting detail: OUTPATIENT SURGERY
Discharge: HOME | End: 2019-03-11
Attending: UROLOGY | Admitting: UROLOGY
Payer: MEDICARE

## 2019-03-11 ENCOUNTER — ANESTHESIA (OUTPATIENT)
Dept: OPERATING ROOM | Age: 74
End: 2019-03-11
Payer: MEDICARE

## 2019-03-11 VITALS
HEIGHT: 66 IN | TEMPERATURE: 97.4 F | OXYGEN SATURATION: 95 % | SYSTOLIC BLOOD PRESSURE: 130 MMHG | DIASTOLIC BLOOD PRESSURE: 61 MMHG | WEIGHT: 244 LBS | BODY MASS INDEX: 39.21 KG/M2 | RESPIRATION RATE: 16 BRPM | HEART RATE: 73 BPM

## 2019-03-11 VITALS
OXYGEN SATURATION: 93 % | SYSTOLIC BLOOD PRESSURE: 139 MMHG | RESPIRATION RATE: 12 BRPM | DIASTOLIC BLOOD PRESSURE: 63 MMHG

## 2019-03-11 LAB — GLUCOSE BLD-MCNC: 118 MG/DL (ref 65–105)

## 2019-03-11 PROCEDURE — 00630 ANES PX LUMBAR REGION NOS: CPT | Performed by: NURSE ANESTHETIST, CERTIFIED REGISTERED

## 2019-03-11 PROCEDURE — 82947 ASSAY GLUCOSE BLOOD QUANT: CPT

## 2019-03-11 PROCEDURE — 6360000002 HC RX W HCPCS

## 2019-03-11 PROCEDURE — 3700000001 HC ADD 15 MINUTES (ANESTHESIA): Performed by: UROLOGY

## 2019-03-11 PROCEDURE — 2500000003 HC RX 250 WO HCPCS: Performed by: NURSE ANESTHETIST, CERTIFIED REGISTERED

## 2019-03-11 PROCEDURE — 6360000002 HC RX W HCPCS: Performed by: UROLOGY

## 2019-03-11 PROCEDURE — 2709999900 HC NON-CHARGEABLE SUPPLY: Performed by: UROLOGY

## 2019-03-11 PROCEDURE — 2500000003 HC RX 250 WO HCPCS: Performed by: UROLOGY

## 2019-03-11 PROCEDURE — 2580000003 HC RX 258: Performed by: NURSE ANESTHETIST, CERTIFIED REGISTERED

## 2019-03-11 PROCEDURE — 7100000011 HC PHASE II RECOVERY - ADDTL 15 MIN: Performed by: UROLOGY

## 2019-03-11 PROCEDURE — 2500000003 HC RX 250 WO HCPCS

## 2019-03-11 PROCEDURE — 7100000010 HC PHASE II RECOVERY - FIRST 15 MIN: Performed by: UROLOGY

## 2019-03-11 PROCEDURE — C1787 PATIENT PROGR, NEUROSTIM: HCPCS | Performed by: UROLOGY

## 2019-03-11 PROCEDURE — 3600000012 HC SURGERY LEVEL 2 ADDTL 15MIN: Performed by: UROLOGY

## 2019-03-11 PROCEDURE — 2580000003 HC RX 258: Performed by: UROLOGY

## 2019-03-11 PROCEDURE — 3700000000 HC ANESTHESIA ATTENDED CARE: Performed by: UROLOGY

## 2019-03-11 PROCEDURE — C1767 GENERATOR, NEURO NON-RECHARG: HCPCS | Performed by: UROLOGY

## 2019-03-11 PROCEDURE — 6360000002 HC RX W HCPCS: Performed by: NURSE ANESTHETIST, CERTIFIED REGISTERED

## 2019-03-11 PROCEDURE — 3600000002 HC SURGERY LEVEL 2 BASE: Performed by: UROLOGY

## 2019-03-11 DEVICE — Z DUP USE 2628873 GENERATOR NEUROSTIMULATOR H1.7XL2IN THK3IN TORQ WRNCH PROD: Type: IMPLANTABLE DEVICE | Site: BACK | Status: FUNCTIONAL

## 2019-03-11 RX ORDER — PROPOFOL 10 MG/ML
INJECTION, EMULSION INTRAVENOUS CONTINUOUS PRN
Status: DISCONTINUED | OUTPATIENT
Start: 2019-03-11 | End: 2019-03-11 | Stop reason: SDUPTHER

## 2019-03-11 RX ORDER — SODIUM CHLORIDE, SODIUM LACTATE, POTASSIUM CHLORIDE, CALCIUM CHLORIDE 600; 310; 30; 20 MG/100ML; MG/100ML; MG/100ML; MG/100ML
INJECTION, SOLUTION INTRAVENOUS CONTINUOUS PRN
Status: DISCONTINUED | OUTPATIENT
Start: 2019-03-11 | End: 2019-03-11 | Stop reason: SDUPTHER

## 2019-03-11 RX ORDER — SODIUM CHLORIDE, SODIUM LACTATE, POTASSIUM CHLORIDE, CALCIUM CHLORIDE 600; 310; 30; 20 MG/100ML; MG/100ML; MG/100ML; MG/100ML
INJECTION, SOLUTION INTRAVENOUS CONTINUOUS
Status: DISCONTINUED | OUTPATIENT
Start: 2019-03-11 | End: 2019-03-11 | Stop reason: HOSPADM

## 2019-03-11 RX ORDER — SODIUM CHLORIDE 0.9 % (FLUSH) 0.9 %
10 SYRINGE (ML) INJECTION EVERY 12 HOURS SCHEDULED
Status: DISCONTINUED | OUTPATIENT
Start: 2019-03-11 | End: 2019-03-11 | Stop reason: HOSPADM

## 2019-03-11 RX ORDER — SODIUM CHLORIDE 0.9 % (FLUSH) 0.9 %
10 SYRINGE (ML) INJECTION PRN
Status: DISCONTINUED | OUTPATIENT
Start: 2019-03-11 | End: 2019-03-11 | Stop reason: HOSPADM

## 2019-03-11 RX ORDER — FENTANYL CITRATE 50 UG/ML
INJECTION, SOLUTION INTRAMUSCULAR; INTRAVENOUS PRN
Status: DISCONTINUED | OUTPATIENT
Start: 2019-03-11 | End: 2019-03-11 | Stop reason: SDUPTHER

## 2019-03-11 RX ORDER — BUPIVACAINE HYDROCHLORIDE AND EPINEPHRINE 2.5; 5 MG/ML; UG/ML
INJECTION, SOLUTION EPIDURAL; INFILTRATION; INTRACAUDAL; PERINEURAL PRN
Status: DISCONTINUED | OUTPATIENT
Start: 2019-03-11 | End: 2019-03-11 | Stop reason: ALTCHOICE

## 2019-03-11 RX ORDER — LIDOCAINE HYDROCHLORIDE 10 MG/ML
INJECTION, SOLUTION EPIDURAL; INFILTRATION; INTRACAUDAL; PERINEURAL PRN
Status: DISCONTINUED | OUTPATIENT
Start: 2019-03-11 | End: 2019-03-11 | Stop reason: SDUPTHER

## 2019-03-11 RX ORDER — CEPHALEXIN 500 MG/1
500 CAPSULE ORAL 4 TIMES DAILY
Qty: 28 CAPSULE | Refills: 0 | Status: SHIPPED | OUTPATIENT
Start: 2019-03-11 | End: 2019-03-18

## 2019-03-11 RX ADMIN — LIDOCAINE HYDROCHLORIDE 50 MG: 10 INJECTION, SOLUTION EPIDURAL; INFILTRATION; INTRACAUDAL; PERINEURAL at 14:36

## 2019-03-11 RX ADMIN — FENTANYL CITRATE 50 MCG: 50 INJECTION, SOLUTION INTRAMUSCULAR; INTRAVENOUS at 14:36

## 2019-03-11 RX ADMIN — Medication 2 G: at 14:36

## 2019-03-11 RX ADMIN — PROPOFOL 100 MCG/KG/MIN: 10 INJECTION, EMULSION INTRAVENOUS at 14:37

## 2019-03-11 RX ADMIN — SODIUM CHLORIDE, POTASSIUM CHLORIDE, SODIUM LACTATE AND CALCIUM CHLORIDE: 600; 310; 30; 20 INJECTION, SOLUTION INTRAVENOUS at 14:29

## 2019-03-11 RX ADMIN — SODIUM CHLORIDE, POTASSIUM CHLORIDE, SODIUM LACTATE AND CALCIUM CHLORIDE: 600; 310; 30; 20 INJECTION, SOLUTION INTRAVENOUS at 14:12

## 2019-03-11 ASSESSMENT — PULMONARY FUNCTION TESTS
PIF_VALUE: 5

## 2019-03-11 ASSESSMENT — PAIN SCALES - GENERAL
PAINLEVEL_OUTOF10: 0

## 2019-03-11 ASSESSMENT — PAIN - FUNCTIONAL ASSESSMENT: PAIN_FUNCTIONAL_ASSESSMENT: 0-10

## 2019-03-11 NOTE — H&P
Sharron eSsay MD  History and Physical    Patient:  Alejandro Scott  MRN: [id number]  YOB: 1945    HISTORY OF PRESENT ILLNESS:     The patient is a 75 y.o. female who presents with OAB. Here for procedure. Patient's old records, notes and chart reviewed and summarized above. Sharron Sesay MD independently reviewed the images and verified the radiology reports from:    Fluoro For Surgical Procedures    Result Date: 3/4/2019  EXAMINATION: SPOT FLUOROSCOPIC IMAGES 3/4/2019 2:56 pm TECHNIQUE: Fluoroscopy was provided by the radiology department for procedure. Radiologist was not present during examination. FLUOROSCOPY DOSE AND TYPE OR TIME AND EXPOSURES: Fluoroscopic dose is 9.6 mGy and the fluoroscopic time is 21.3 seconds. COMPARISON: None. HISTORY: Intraprocedural imaging. Patient is a 39-year-old female having sacral nerve stimulator placement. FINDINGS: 3 spot images of the sacrococcygeal region were obtained. Fluoroscopic evaluation was performed during sacral nerve stimulator placement. No radiologist was present for this procedure. Intraprocedural fluoroscopic spot images as above. See separate procedure report for more information. Past Medical History:    Past Medical History:   Diagnosis Date    DMII (diabetes mellitus, type 2) ([facility] [address]     Hypertension     Sleep apnea        Past Surgical History:    Past Surgical History:   Procedure Laterality Date    COLONOSCOPY      CYSTOSCOPY      DENTAL SURGERY      EYE SURGERY      [place] CYSTOURETHROSCOPY N/A 11/29/2018    CYSTOSCOPY performed by Feliciano Huynh MD at [facility] OFFICE/OUTPT [address] [place] [address] N/A 11/29/2018    URODYNAMICS performed by Feliciano Huynh MD at [facility]. N/A 3/4/2019    Interstim Sacral Nerve Implant Stage I performed by Feliciano Huynh MD at [address] [place] [address]       Medications Prior to Admission:    Prior to Admission medications    Medication Sig Start Date End Date Taking?  Authorizing Provider   Multiple Vitamin (MULTI-VITAMIN DAILY PO) Take by mouth    Historical Provider, MD   atorvastatin (LIPITOR) 40 MG tablet TAKE ONE-HALF TABLET BY MOUTH ONCE DAILY 3/20/18   Historical Provider, MD   Cholecalciferol (VITAMIN D3) 3000 units TABS Take 1,000 Units by mouth    Historical Provider, MD   clonazePAM (KLONOPIN) 1 MG tablet TAKE 1 TABLET BY MOUTH EVERY 8 HOURS 9/26/18   Historical Provider, MD   clopidogrel (PLAVIX) 75 MG tablet TAKE 1 TABLET BY MOUTH ONCE DAILY 8/27/18   Historical Provider, MD   memantine (NAMENDA) 5 MG tablet TAKE 1 TABLET BY MOUTH TWICE DAILY 9/26/18   Historical Provider, MD   lisinopril (PRINIVIL;ZESTRIL) 20 MG tablet TAKE 1 TABLET BY MOUTH ONCE DAILY 8/27/18   Historical Provider, MD   metFORMIN (GLUCOPHAGE-XR) 500 MG extended release tablet 2 po bid 1/25/18   Historical Provider, MD   PARoxetine (PAXIL) 40 MG tablet TAKE 1/2 (ONE-HALF) TABLET BY MOUTH ONCE DAILY FOR 6 DAYS.  THEN TAKE 1 TABLET ONCE DAILY THEREAFTER 7/12/18   Historical Provider, MD   colestipol (COLESTID) 1 g tablet ONE TABLET BY MOUTH THREE TIMES A DAY  MAY TAKE 4 TIMES A DAY AS NEEDED FOR DIARREA 8/9/18   Historical Provider, MD   donepezil (ARICEPT) 5 MG tablet TAKE 1 TABLET BY MOUTH AT BEDTIME 9/13/18   Historical Provider, MD   glimepiride (AMARYL) 4 MG tablet TAKE ONE TABLET BY MOUTH TWICE DAILY 3/20/18   Historical Provider, MD   traZODone (DESYREL) 50 MG tablet TAKE 1 TABLET BY MOUTH NIGHTLY 9/27/18   Historical Provider, MD       Allergies:  Sulfa antibiotics    Social History:    Social History     Socioeconomic History    Marital status:      Spouse name: Not on file    Number of children: Not on file    Years of education: Not on file    Highest education level: Not on file   Occupational History    Not on file   Social Needs    Financial resource strain: Not on file    Food insecurity:     Worry: Not on file     Inability: Not on file    Transportation needs:     Medical: Not on file     Non-medical: Not on file   Tobacco Use    Smoking status: Never Smoker    Smokeless tobacco: Never Used   Substance and Sexual Activity    Alcohol use: Not Currently    Drug use: Not Currently    Sexual activity: Not Currently   Lifestyle    Physical activity:     Days per week: Not on file     Minutes per session: Not on file    Stress: Not on file   Relationships    Social connections:     Talks on phone: Not on file     Gets together: Not on file     Attends Tenriism service: Not on file     Active member of club or organization: Not on file     Attends meetings of clubs or organizations: Not on file     Relationship status: Not on file    Intimate partner violence:     Fear of current or ex partner: Not on file     Emotionally abused: Not on file     Physically abused: Not on file     Forced sexual activity: Not on file   Other Topics Concern    Not on file   Social History Narrative    Not on file       Family History:  No family history on file. REVIEW OF SYSTEMS:  Constitutional: negative  Eyes: negative  Respiratory: negative  Cardiovascular: negative  Gastrointestinal: negative  Genitourinary: no acute issues  Musculoskeletal: negative  Skin: negative   Neurological: negative  Hematological/Lymphatic: negative  Psychological: negative    Physical Exam:      No data found. Constitutional: Patient in no acute distress; Neuro: alert and oriented to person place and time. Psych: Mood and affect normal.  Skin: Normal  Lungs: Respiratory effort normal, CTA  Cardiovascular:  Normal peripheral pulses; no murmur. Normal rhythm  Abdomen: Soft, non-tender, non-distended with no CVA, flank pain, hepatosplenomegaly or hernia. Kidneys normal.  Bladder non-tender and not distended. LABS:   No results for input(s): WBC, HGB, HCT, MCV, PLT in the last 72 hours. No results for input(s): NA, K, CL, CO2, PHOS, BUN, CREATININE in the last 72 hours.     Invalid input(s): CA  No results found for: PSA      Urinalysis: No results for input(s): COLORU, PHUR, LABCAST, WBCUA, RBCUA, MUCUS, TRICHOMONAS, YEAST, BACTERIA, CLARITYU, SPECGRAV, LEUKOCYTESUR, UROBILINOGEN, BILIRUBINUR, BLOODU in the last 72 hours. Invalid input(s): NITRATE, GLUCOSEUKETONESUAMORPHOUS     -----------------------------------------------------------------      Assessment and Plan     Impression:  There is no problem list on file for this patient.       Plan:     Consent obtained; stage II in OR today    Cali Soto MD  1:28 PM 3/11/2019

## 2019-03-13 NOTE — OP NOTE
Patient:  Cordell Vernon  MRN: [id number]  YOB: 1945    FACILITY: [facility]    DATE: 3/11/2019    SURGEON: Jose Alberto Baltazar MD     ASSISTANT: none    PREOPERATIVE DIAGNOSIS: overactive bladder     POSTOPERATIVE DIAGNOSIS: OAB    PROCEDURE PERFORMED: Interstim Sacral Nerve Implant Stage II    1. Interstim Sacral Neuromodulation System Placement, Stage 2    ANESTHESIA: Monitor Anesthesia Care    ESTIMATED BLOOD LOSS: * No values recorded between 3/11/2019  2:29 PM and 3/11/2019  1:70 PM *     COMPLICATIONS: None immediate    DRAINS: none    SPECIMENS: * No specimens in log *    INDICATIONS FOR PROCEDURE:  The patient is a 75 y.o. female who presents with urinary frequency, urgency, and urge incontinence which was refractory to anticholinergic medications and behavioral modifications. Two weeks ago she underwent InterStim stage 1 placement and over the last 2 weeks has experienced greater than 50% improvement in symptoms. she presents today for 2nd stage placement of permanent stimulator. The risks and benefits of the procedure as well as possible alternatives and complications were discussed and she consented. Narrative of the Procedure:    After informed consent was obtained, the patient was brought to the operating room and placed on the operating table in the prone position. Anesthesia was induced and antibiotics were given. The patient was prepped and draped in a sterile manner. To prevent contamination, the wire extension was left hanging off the patient on the side contralateral to the pocket. The wire overlying the surgical site was thoroughly cleaned. A timeout occurred in which two patient identifiers were used. The scar overlying the site of previous connection was located and anesthetized. Using a 15-blade scalpel a skin incision was made over the scar. Blunt dissection was used to develop a pocket. The connection was identified and delivered through the incision.  The wire extension was transected at the level of the patient's skin and removed from under the drape. The prolene sutures surrounding the boot were incised and removed. The boot was retracted and the quick-connection wrench was used to loosen the screws. The boot and wire extension were removed intact and discarded. The lead was cleaned and the battery was attached using the quick-connection wrench. Care was taken to ensure the blue electrode tip was seen through the distal window on the battery. The battery was placed into the wound. The device was then interrogated. All resistances were within normal limits. The dermis was re-approximated with 2-0 Vicryl. The skin was closed with a 4-0 Monocryl in a subcuticular manner. The patient was then awakened and transferred to the PACU in good and stable condition. Follow-Up: The patient will be discharged on 5 days of Keflex. Michelle Garcias MD  Electronically signed on 3/12/2019 at 11:52 PM     DISPOSITION:  The patient was discharged home in stable condition with instructions on  the functionality of her device. she was instructed to follow up in clinic in 2-3 weeks to evaluate for continued symptom control. The InterStim rep was present and available for questions.

## 2019-04-01 ENCOUNTER — OFFICE VISIT (OUTPATIENT)
Dept: UROLOGY | Age: 74
End: 2019-04-01

## 2019-04-01 VITALS
WEIGHT: 244 LBS | BODY MASS INDEX: 39.21 KG/M2 | SYSTOLIC BLOOD PRESSURE: 124 MMHG | OXYGEN SATURATION: 93 % | DIASTOLIC BLOOD PRESSURE: 86 MMHG | HEIGHT: 66 IN | HEART RATE: 60 BPM

## 2019-04-01 DIAGNOSIS — N39.41 URGE INCONTINENCE: ICD-10-CM

## 2019-04-01 DIAGNOSIS — N39.3 STRESS INCONTINENCE: Primary | ICD-10-CM

## 2019-04-01 PROCEDURE — 99024 POSTOP FOLLOW-UP VISIT: CPT | Performed by: UROLOGY

## 2019-04-01 NOTE — PROGRESS NOTES
[address]  [address] [facility]  Dept: [phone]  Dept Fax: [phone]: [phone]    [address], [address] [facility] Urology Office Note - New Patient    Patient:  Birdie Alcantar  YOB: 1945  Date: 4/9/2019    The patient is a 75 y.o. female who presents today for evaluation of the following problems:   Chief Complaint   Patient presents with    Other     post interstim     referred/consultation requested by Lissette Calvo MD.    HISTORY OF PRESENT ILLNESS:     Onset was  Years ago  Overall, the problem(s) are better  Severity is described as moderate to severe. Associated Symptoms: No dysuria, no gross hematuria. Current Pain Severity: 0    Has seen improvement with interstim  Still with some stress incontinence      Summary of Previous Records:  Hx of sling by Dr Rodriguez Officer  Has had continued incontinence  Failed anticholinergics  Very unappy about urinary symptoms  No recent UTI's        Requested/reviewed records from Dr Rodriguez Officer office and/or outside [de-identified]    (Patient's old records have been requested, reviewed and pertinent findings summarized in today's note.)    Procedures Today: N/A    Last several PSA's:  No results found for: PSA    Last total testosterone:  No results found for: TESTOSTERONE    Urinalysis today:  No results found for this visit on 04/01/19. Last BUN and creatinine:  No results found for: BUN  No results found for: CREATININE    Additional Lab/Culture results: none    Imaging Reviewed during this Office Visit:   Divya Caraballo MD independently reviewed the images and verified the radiology reports from:    No results found.     PAST MEDICAL, FAMILY AND SOCIAL HISTORY:  Past Medical History:   Diagnosis Date    DMII (diabetes mellitus, type 2) ([facility] [address]     Hypertension     Sleep apnea      Past Surgical History:   Procedure Laterality Date    COLONOSCOPY      CYSTOSCOPY      DENTAL SURGERY      EYE SURGERY      [place] CYSTOURETHROSCOPY N/A 11/29/2018    CYSTOSCOPY performed by Master Cottrell MD at [address] OFFICE/OUTPT [facility] N/A 11/29/2018    URODYNAMICS performed by Master Cottrell MD at [address] [facility] [facility] [address] N/A 3/4/2019    Interstim Sacral Nerve Implant Stage I performed by Master Cottrell MD at [address] [facility] N/A 3/11/2019    Interstim Sacral Nerve Implant Stage II performed by Master Cottrell MD at [address] [facility] reviewed. No pertinent family history. Outpatient Medications Marked as Taking for the 4/1/19 encounter (Office Visit) with Master Cottrell MD   Medication Sig Dispense Refill    Multiple Vitamin (MULTI-VITAMIN DAILY PO) Take by mouth      atorvastatin (LIPITOR) 40 MG tablet TAKE ONE-HALF TABLET BY MOUTH ONCE DAILY      Cholecalciferol (VITAMIN D3) 3000 units TABS Take 1,000 Units by mouth      clonazePAM (KLONOPIN) 1 MG tablet TAKE 1 TABLET BY MOUTH EVERY 8 HOURS      memantine (NAMENDA) 5 MG tablet TAKE 1 TABLET BY MOUTH TWICE DAILY      lisinopril (PRINIVIL;ZESTRIL) 20 MG tablet TAKE 1 TABLET BY MOUTH ONCE DAILY      metFORMIN (GLUCOPHAGE-XR) 500 MG extended release tablet 2 po bid      PARoxetine (PAXIL) 40 MG tablet TAKE 1/2 (ONE-HALF) TABLET BY MOUTH ONCE DAILY FOR 6 DAYS.  THEN TAKE 1 TABLET ONCE DAILY THEREAFTER      colestipol (COLESTID) 1 g tablet ONE TABLET BY MOUTH THREE TIMES A DAY  MAY TAKE 4 TIMES A DAY AS NEEDED FOR DIARREA      donepezil (ARICEPT) 5 MG tablet TAKE 1 TABLET BY MOUTH AT BEDTIME      glimepiride (AMARYL) 4 MG tablet TAKE ONE TABLET BY MOUTH TWICE DAILY      traZODone (DESYREL) 50 MG tablet TAKE 1 TABLET BY MOUTH NIGHTLY         Sulfa antibiotics  Social History     Tobacco Use   Smoking Status Never Smoker   Smokeless Tobacco Never Used      (If patient a smoker, smoking cessation counseling offered)   Social History     Substance and Sexual Activity   Alcohol Use Not Currently       REVIEW OF

## 2019-04-10 ENCOUNTER — OFFICE VISIT (OUTPATIENT)
Dept: PULMONOLOGY | Age: 74
End: 2019-04-10
Payer: MEDICARE

## 2019-04-10 VITALS
BODY MASS INDEX: 39.12 KG/M2 | HEART RATE: 85 BPM | DIASTOLIC BLOOD PRESSURE: 74 MMHG | SYSTOLIC BLOOD PRESSURE: 126 MMHG | HEIGHT: 66 IN | OXYGEN SATURATION: 90 % | WEIGHT: 243.4 LBS

## 2019-04-10 DIAGNOSIS — G47.33 OSA ON CPAP: ICD-10-CM

## 2019-04-10 DIAGNOSIS — E11.9 TYPE 2 DIABETES MELLITUS WITHOUT COMPLICATION, WITHOUT LONG-TERM CURRENT USE OF INSULIN (HCC): ICD-10-CM

## 2019-04-10 DIAGNOSIS — Z99.89 OSA ON CPAP: ICD-10-CM

## 2019-04-10 DIAGNOSIS — R32 BOWEL AND BLADDER INCONTINENCE: ICD-10-CM

## 2019-04-10 DIAGNOSIS — Z87.891 STOPPED SMOKING WITH GREATER THAN 10 PACK YEAR HISTORY: ICD-10-CM

## 2019-04-10 DIAGNOSIS — E66.01 CLASS 2 SEVERE OBESITY DUE TO EXCESS CALORIES WITH SERIOUS COMORBIDITY AND BODY MASS INDEX (BMI) OF 39.0 TO 39.9 IN ADULT (HCC): ICD-10-CM

## 2019-04-10 DIAGNOSIS — R15.9 BOWEL AND BLADDER INCONTINENCE: ICD-10-CM

## 2019-04-10 DIAGNOSIS — J44.9 STAGE 1 MILD COPD BY GOLD CLASSIFICATION (HCC): Primary | ICD-10-CM

## 2019-04-10 PROBLEM — E66.812 CLASS 2 SEVERE OBESITY DUE TO EXCESS CALORIES WITH SERIOUS COMORBIDITY AND BODY MASS INDEX (BMI) OF 37.0 TO 37.9 IN ADULT: Status: ACTIVE | Noted: 2019-04-10

## 2019-04-10 PROCEDURE — 1123F ACP DISCUSS/DSCN MKR DOCD: CPT | Performed by: INTERNAL MEDICINE

## 2019-04-10 PROCEDURE — 3017F COLORECTAL CA SCREEN DOC REV: CPT | Performed by: INTERNAL MEDICINE

## 2019-04-10 PROCEDURE — G8926 SPIRO NO PERF OR DOC: HCPCS | Performed by: INTERNAL MEDICINE

## 2019-04-10 PROCEDURE — 3023F SPIROM DOC REV: CPT | Performed by: INTERNAL MEDICINE

## 2019-04-10 PROCEDURE — 1036F TOBACCO NON-USER: CPT | Performed by: INTERNAL MEDICINE

## 2019-04-10 PROCEDURE — G8417 CALC BMI ABV UP PARAM F/U: HCPCS | Performed by: INTERNAL MEDICINE

## 2019-04-10 PROCEDURE — 1090F PRES/ABSN URINE INCON ASSESS: CPT | Performed by: INTERNAL MEDICINE

## 2019-04-10 PROCEDURE — 0509F URINE INCON PLAN DOCD: CPT | Performed by: INTERNAL MEDICINE

## 2019-04-10 PROCEDURE — 4040F PNEUMOC VAC/ADMIN/RCVD: CPT | Performed by: INTERNAL MEDICINE

## 2019-04-10 PROCEDURE — 2022F DILAT RTA XM EVC RTNOPTHY: CPT | Performed by: INTERNAL MEDICINE

## 2019-04-10 PROCEDURE — G8400 PT W/DXA NO RESULTS DOC: HCPCS | Performed by: INTERNAL MEDICINE

## 2019-04-10 PROCEDURE — G8427 DOCREV CUR MEDS BY ELIG CLIN: HCPCS | Performed by: INTERNAL MEDICINE

## 2019-04-10 PROCEDURE — 3046F HEMOGLOBIN A1C LEVEL >9.0%: CPT | Performed by: INTERNAL MEDICINE

## 2019-04-10 PROCEDURE — 99213 OFFICE O/P EST LOW 20 MIN: CPT | Performed by: INTERNAL MEDICINE

## 2019-04-10 RX ORDER — ALBUTEROL SULFATE 90 UG/1
2 AEROSOL, METERED RESPIRATORY (INHALATION) EVERY 6 HOURS PRN
Qty: 1 INHALER | Refills: 11 | Status: SHIPPED | OUTPATIENT
Start: 2019-04-10 | End: 2020-04-21

## 2019-04-10 ASSESSMENT — ENCOUNTER SYMPTOMS
EYES NEGATIVE: 1
APNEA: 1
SHORTNESS OF BREATH: 1
GASTROINTESTINAL NEGATIVE: 1

## 2019-04-10 NOTE — PROGRESS NOTES
normal. No respiratory distress. She has no wheezes. She has no rales. She exhibits no tenderness. Abdominal: Soft. There is no tenderness. Musculoskeletal: She exhibits no edema. Lymphadenopathy:     She has no cervical adenopathy. Neurological: She is alert and oriented to person, place, and time. Skin: Skin is warm and dry. Nursing note and vitals reviewed. Wt Readings from Last 3 Encounters:   04/10/19 243 lb 6.4 oz (110.4 kg)   04/01/19 244 lb (110.7 kg)   03/11/19 244 lb (110.7 kg)          Results for orders placed or performed during the hospital encounter of 03/11/19   POC Glucose Fingerstick   Result Value Ref Range    POC Glucose 118 (H) 65 - 105 mg/dL       Assessment:         1. Stage 1 mild COPD by GOLD classification ([address]    2. VÍCTOR on CPAP    3. Class 2 severe obesity due to excess calories with serious comorbidity and body mass index (BMI) of 39.0 to 39.9 in adult ([address]    4. Bowel and bladder incontinence    5. Type 2 diabetes mellitus without complication, without long-term current use of insulin (HCC)    6. Stopped smoking with greater than 10 pack year history          Plan:      1. Ventolin HFA when necessary. 2. Resume CPAP. 3. New mask tubing and supplies. 4. Weight loss. 5. Return in one year. Sooner if new or advancing symptoms.      Electronically signed by Kallie Ramos DO on 4/10/2019at 2:20 PM

## 2019-05-01 ENCOUNTER — TELEPHONE (OUTPATIENT)
Dept: UROLOGY | Age: 74
End: 2019-05-01

## 2019-05-01 NOTE — TELEPHONE ENCOUNTER
Patient had a procedure done with Dr. Corina Cobian,  stated the patient has abdominal pressure and isn't sure if the device is working.  asked for Eric[employer] number that helps with the device. Please call them back at [phone].

## 2019-05-01 NOTE — TELEPHONE ENCOUNTER
Contacted patient's  let him know that writer contacted Hugo Londono with [employer], left message for her to contact patient.

## 2019-05-23 ENCOUNTER — TELEPHONE (OUTPATIENT)
Dept: INTERNAL MEDICINE | Age: 74
End: 2019-05-23

## 2019-05-23 NOTE — TELEPHONE ENCOUNTER
Last appt: Visit date not found  Next appt: Visit date not found    Patient  called in asking if Tiffanie Man could write a script to have the concentrator removed from the house. Patient has not uses it in over a month.   We just need to fax the order to [phone]

## 2019-05-24 DIAGNOSIS — J44.9 STAGE 1 MILD COPD BY GOLD CLASSIFICATION (HCC): ICD-10-CM

## 2019-05-24 DIAGNOSIS — J96.11 CHRONIC RESPIRATORY FAILURE WITH HYPOXIA (HCC): Primary | ICD-10-CM

## 2019-05-24 NOTE — TELEPHONE ENCOUNTER
Faxed to [facility] to have them remove it from home. Results within acceptable limits.  -Microalbumin (urine protein) test is normal.  ADVISE: recheck annually.

## 2019-07-01 ENCOUNTER — OFFICE VISIT (OUTPATIENT)
Dept: UROLOGY | Age: 74
End: 2019-07-01
Payer: MEDICARE

## 2019-07-01 VITALS
HEART RATE: 87 BPM | HEIGHT: 66 IN | WEIGHT: 243.39 LBS | DIASTOLIC BLOOD PRESSURE: 78 MMHG | SYSTOLIC BLOOD PRESSURE: 136 MMHG | BODY MASS INDEX: 39.12 KG/M2

## 2019-07-01 DIAGNOSIS — N39.44 NOCTURNAL ENURESIS: ICD-10-CM

## 2019-07-01 DIAGNOSIS — N39.46 MIXED INCONTINENCE: Primary | ICD-10-CM

## 2019-07-01 PROCEDURE — 1036F TOBACCO NON-USER: CPT | Performed by: UROLOGY

## 2019-07-01 PROCEDURE — G8400 PT W/DXA NO RESULTS DOC: HCPCS | Performed by: UROLOGY

## 2019-07-01 PROCEDURE — 1123F ACP DISCUSS/DSCN MKR DOCD: CPT | Performed by: UROLOGY

## 2019-07-01 PROCEDURE — 3017F COLORECTAL CA SCREEN DOC REV: CPT | Performed by: UROLOGY

## 2019-07-01 PROCEDURE — G8417 CALC BMI ABV UP PARAM F/U: HCPCS | Performed by: UROLOGY

## 2019-07-01 PROCEDURE — 4040F PNEUMOC VAC/ADMIN/RCVD: CPT | Performed by: UROLOGY

## 2019-07-01 PROCEDURE — 99213 OFFICE O/P EST LOW 20 MIN: CPT | Performed by: UROLOGY

## 2019-07-01 PROCEDURE — 1090F PRES/ABSN URINE INCON ASSESS: CPT | Performed by: UROLOGY

## 2019-07-01 PROCEDURE — G8427 DOCREV CUR MEDS BY ELIG CLIN: HCPCS | Performed by: UROLOGY

## 2019-07-01 PROCEDURE — 0509F URINE INCON PLAN DOCD: CPT | Performed by: UROLOGY

## 2019-07-29 ENCOUNTER — OFFICE VISIT (OUTPATIENT)
Dept: OPHTHALMOLOGY | Age: 74
End: 2019-07-29
Payer: MEDICARE

## 2019-07-29 DIAGNOSIS — Z96.1 PSEUDOPHAKIA OF BOTH EYES: ICD-10-CM

## 2019-07-29 DIAGNOSIS — H04.121 DRY EYE SYNDROME OF RIGHT EYE: ICD-10-CM

## 2019-07-29 DIAGNOSIS — H02.103 ECTROPION DUE TO LAXITY OF EYELID, RIGHT: Primary | ICD-10-CM

## 2019-07-29 PROCEDURE — G8427 DOCREV CUR MEDS BY ELIG CLIN: HCPCS | Performed by: OPHTHALMOLOGY

## 2019-07-29 PROCEDURE — G8400 PT W/DXA NO RESULTS DOC: HCPCS | Performed by: OPHTHALMOLOGY

## 2019-07-29 PROCEDURE — 4040F PNEUMOC VAC/ADMIN/RCVD: CPT | Performed by: OPHTHALMOLOGY

## 2019-07-29 PROCEDURE — 1123F ACP DISCUSS/DSCN MKR DOCD: CPT | Performed by: OPHTHALMOLOGY

## 2019-07-29 PROCEDURE — G8417 CALC BMI ABV UP PARAM F/U: HCPCS | Performed by: OPHTHALMOLOGY

## 2019-07-29 PROCEDURE — 1090F PRES/ABSN URINE INCON ASSESS: CPT | Performed by: OPHTHALMOLOGY

## 2019-07-29 PROCEDURE — 3017F COLORECTAL CA SCREEN DOC REV: CPT | Performed by: OPHTHALMOLOGY

## 2019-07-29 PROCEDURE — 1036F TOBACCO NON-USER: CPT | Performed by: OPHTHALMOLOGY

## 2019-07-29 PROCEDURE — 99203 OFFICE O/P NEW LOW 30 MIN: CPT | Performed by: OPHTHALMOLOGY

## 2019-07-29 ASSESSMENT — VISUAL ACUITY
OD_SC: 20/25
METHOD: SNELLEN - LINEAR
OS_SC: 20/40

## 2019-07-29 ASSESSMENT — TONOMETRY
OD_IOP_MMHG: 13
IOP_METHOD: NON-CONTACT AIR PUFF
OS_IOP_MMHG: 115

## 2019-07-29 ASSESSMENT — SLIT LAMP EXAM - LIDS: COMMENTS: MILD BLEPHARITIS. MILD MGD

## 2019-07-29 NOTE — PROGRESS NOTES
TABLET BY MOUTH ONCE DAILY, Disp: , Rfl:     Cholecalciferol (VITAMIN D3) 3000 units TABS, Take 1,000 Units by mouth, Disp: , Rfl:     clonazePAM (KLONOPIN) 1 MG tablet, TAKE 1 TABLET BY MOUTH EVERY 8 HOURS, Disp: , Rfl:     memantine (NAMENDA) 5 MG tablet, TAKE 1 TABLET BY MOUTH TWICE DAILY, Disp: , Rfl:     lisinopril (PRINIVIL;ZESTRIL) 20 MG tablet, TAKE 1 TABLET BY MOUTH ONCE DAILY, Disp: , Rfl:     metFORMIN (GLUCOPHAGE-XR) 500 MG extended release tablet, 2 po bid, Disp: , Rfl:     PARoxetine (PAXIL) 40 MG tablet, TAKE 1/2 (ONE-HALF) TABLET BY MOUTH ONCE DAILY FOR 6 DAYS. THEN TAKE 1 TABLET ONCE DAILY THEREAFTER, Disp: , Rfl:     colestipol (COLESTID) 1 g tablet, ONE TABLET BY MOUTH THREE TIMES A DAY  MAY TAKE 4 TIMES A DAY AS NEEDED FOR DIARREA, Disp: , Rfl:     donepezil (ARICEPT) 5 MG tablet, TAKE 1 TABLET BY MOUTH AT BEDTIME, Disp: , Rfl:     glimepiride (AMARYL) 4 MG tablet, TAKE ONE TABLET BY MOUTH TWICE DAILY, Disp: , Rfl:     traZODone (DESYREL) 50 MG tablet, TAKE 1 TABLET BY MOUTH NIGHTLY, Disp: , Rfl:      Allergies   Allergen Reactions    Sulfa Antibiotics Rash        IMPRESSION:  1. Ectropion due to laxity of eyelid, right    2. Dry eye syndrome of right eye    3. Pseudophakia of both eyes        PLAN:    1. Ectropion due to laxity of eyelid, right    Refer to [facility] for additional evaluation. 2. Dry eye syndrome of right eye    Counseled pt regarding BERNY, Blepharitis,  and MGD. Educated pt about daily eyelid hygiene, warm compresses,   and artificial tears PRN. Follow. 3. Pseudophakia of both eyes    Follow.     Electronically signed by Kenneth Wilson MD on 7/29/2019 at 3:11 PM

## 2019-08-05 ENCOUNTER — OFFICE VISIT (OUTPATIENT)
Dept: UROLOGY | Age: 74
End: 2019-08-05
Payer: MEDICARE

## 2019-08-05 VITALS
HEART RATE: 91 BPM | BODY MASS INDEX: 39.12 KG/M2 | DIASTOLIC BLOOD PRESSURE: 82 MMHG | HEIGHT: 66 IN | WEIGHT: 243.39 LBS | SYSTOLIC BLOOD PRESSURE: 122 MMHG

## 2019-08-05 DIAGNOSIS — N39.46 MIXED INCONTINENCE: Primary | ICD-10-CM

## 2019-08-05 DIAGNOSIS — N39.44 NOCTURNAL ENURESIS: ICD-10-CM

## 2019-08-05 PROCEDURE — G8417 CALC BMI ABV UP PARAM F/U: HCPCS | Performed by: UROLOGY

## 2019-08-05 PROCEDURE — 99214 OFFICE O/P EST MOD 30 MIN: CPT | Performed by: UROLOGY

## 2019-08-05 PROCEDURE — 4040F PNEUMOC VAC/ADMIN/RCVD: CPT | Performed by: UROLOGY

## 2019-08-05 PROCEDURE — 0509F URINE INCON PLAN DOCD: CPT | Performed by: UROLOGY

## 2019-08-05 PROCEDURE — 1036F TOBACCO NON-USER: CPT | Performed by: UROLOGY

## 2019-08-05 PROCEDURE — 1090F PRES/ABSN URINE INCON ASSESS: CPT | Performed by: UROLOGY

## 2019-08-05 PROCEDURE — 3017F COLORECTAL CA SCREEN DOC REV: CPT | Performed by: UROLOGY

## 2019-08-05 PROCEDURE — 1123F ACP DISCUSS/DSCN MKR DOCD: CPT | Performed by: UROLOGY

## 2019-08-05 PROCEDURE — G8400 PT W/DXA NO RESULTS DOC: HCPCS | Performed by: UROLOGY

## 2019-08-05 PROCEDURE — G8427 DOCREV CUR MEDS BY ELIG CLIN: HCPCS | Performed by: UROLOGY

## 2019-08-05 NOTE — PROGRESS NOTES
[facility]  Dept: [phone]  Dept Fax: [phone]  Loc: [phone]    GURINDER Carmichael, [address] [facility] Urology Office Note - follow up Patient    Patient:  Radha Gomez  YOB: 1945  Date: 8/5/2019    The patient is a 75 y.o. female who presents today for evaluation of the following problems:   Chief Complaint   Patient presents with    Other     1 month med check- stopped myrbetriq- on ditropan     referred/consultation requested by Manuel Thomas MD.    HISTORY OF PRESENT ILLNESS:     Onset was  Years ago  Overall, the problem(s) are unchanged  Severity is described as moderate to severe. Associated Symptoms: No dysuria, no gross hematuria. Current Pain Severity: 0    Has seen improvement with interstim  Still with some stress incontinence  OAB stable. On ditropan. Still bothered by nighttime incontinence. Summary of Previous Records:  Hx of sling by Dr Floyd Stevenson  Has had continued incontinence  Failed anticholinergics  Very unappy about urinary symptoms  No recent UTI's        Requested/reviewed records from Dr Floyd Stevenson office and/or outside [de-identified]    (Patient's old records have been requested, reviewed and pertinent findings summarized in today's note.)    Procedures Today: N/A    Last several PSA's:  No results found for: PSA    Last total testosterone:  No results found for: TESTOSTERONE    Urinalysis today:  No results found for this visit on 08/05/19. Last BUN and creatinine:  No results found for: BUN  No results found for: CREATININE    Additional Lab/Culture results: none    Imaging Reviewed during this Office Visit:   imaging independently reviewed by Dano Irwin MD and radiology report verified demonstrating     No results found.       PAST MEDICAL, FAMILY AND SOCIAL HISTORY:  Past Medical History:   Diagnosis Date    DMII (diabetes mellitus, type 2) ([facility] [address]     Hypertension     Sleep

## 2020-02-03 ENCOUNTER — OFFICE VISIT (OUTPATIENT)
Dept: PODIATRY | Age: 75
End: 2020-02-03
Payer: MEDICARE

## 2020-02-03 VITALS
SYSTOLIC BLOOD PRESSURE: 128 MMHG | HEIGHT: 66 IN | BODY MASS INDEX: 34.72 KG/M2 | DIASTOLIC BLOOD PRESSURE: 88 MMHG | HEART RATE: 84 BPM | WEIGHT: 216 LBS

## 2020-02-03 PROCEDURE — G8400 PT W/DXA NO RESULTS DOC: HCPCS | Performed by: PODIATRIST

## 2020-02-03 PROCEDURE — 99203 OFFICE O/P NEW LOW 30 MIN: CPT | Performed by: PODIATRIST

## 2020-02-03 PROCEDURE — 1090F PRES/ABSN URINE INCON ASSESS: CPT | Performed by: PODIATRIST

## 2020-02-03 PROCEDURE — 1036F TOBACCO NON-USER: CPT | Performed by: PODIATRIST

## 2020-02-03 PROCEDURE — G8484 FLU IMMUNIZE NO ADMIN: HCPCS | Performed by: PODIATRIST

## 2020-02-03 PROCEDURE — 1123F ACP DISCUSS/DSCN MKR DOCD: CPT | Performed by: PODIATRIST

## 2020-02-03 PROCEDURE — 2022F DILAT RTA XM EVC RTNOPTHY: CPT | Performed by: PODIATRIST

## 2020-02-03 PROCEDURE — 99214 OFFICE O/P EST MOD 30 MIN: CPT

## 2020-02-03 PROCEDURE — 3017F COLORECTAL CA SCREEN DOC REV: CPT | Performed by: PODIATRIST

## 2020-02-03 PROCEDURE — 11056 PARNG/CUTG B9 HYPRKR LES 2-4: CPT | Performed by: PODIATRIST

## 2020-02-03 PROCEDURE — G8428 CUR MEDS NOT DOCUMENT: HCPCS | Performed by: PODIATRIST

## 2020-02-03 PROCEDURE — G8417 CALC BMI ABV UP PARAM F/U: HCPCS | Performed by: PODIATRIST

## 2020-02-03 PROCEDURE — 11721 DEBRIDE NAIL 6 OR MORE: CPT | Performed by: PODIATRIST

## 2020-02-03 PROCEDURE — 4040F PNEUMOC VAC/ADMIN/RCVD: CPT | Performed by: PODIATRIST

## 2020-02-03 PROCEDURE — 3046F HEMOGLOBIN A1C LEVEL >9.0%: CPT | Performed by: PODIATRIST

## 2020-02-03 NOTE — PROGRESS NOTES
Foot Care Worksheet  PCP: Mellissa Austin MD  Last visit: 1/31/2020    Nail description:  Thick , Yellow , Crumbly , Marked limitation of ambulation     Pain resulting from thickened and dystrophy of nail plate No    Nails involved  Right   1, 2, 5  (T5-T9)  Left     1, 2, 3, 4, 5  (TA-T4)    Q7 1 Class A Finding - Non traumatic amputation of foot No    Q8 2 Class B Findings - Absent DP pulse No, Absent PT pulse No, Advanced tropic changes (3 required) Yes    Decrease hair growth Yes, Nail changes/thickening Yes, Pigmented changes/discoloration Yes, Skin texture (thin, shiny) Yes, Skin color (rubor/redness) No    Q9 1 Class B and 2 Class C Findings  Claudication No, Temperature change Yes, Paresthesia Yes, Burning No, Edema Yes

## 2020-02-03 NOTE — PROGRESS NOTES
Subjective:  Kimberly Phipps is a 75 y.o. female who presents to the office today complaining of contracted toes. .  Symptoms beganyear(s) ago. Patient relates pain is Absent . Pain is rated 0 out of 10 and is described as none. Treatments prior to today's visit include: none. Pt also has numbness in feet. No pain. Present for awhile. No tx tried. Pt also request routine foot care. .  Currently denies F/C/N/V. Allergies   Allergen Reactions    Sulfa Antibiotics Rash       Past Medical History:   Diagnosis Date    [facility] [address]     DMII (diabetes mellitus, type 2) ([facility] [address] [facility]     Hyperlipemia     Hypertension     Hypertension     Sleep apnea        Prior to Admission medications    Medication Sig Start Date End Date Taking? Authorizing Provider   albuterol sulfate HFA (VENTOLIN HFA) 108 (90 Base) MCG/ACT inhaler Inhale 2 puffs into the lungs every 6 hours as needed for Wheezing or Shortness of Breath 4/10/19  Yes Domenic Madrigal, DO   Multiple Vitamin (MULTI-VITAMIN DAILY PO) Take by mouth   Yes Historical Provider, MD   atorvastatin (LIPITOR) 40 MG tablet TAKE ONE-HALF TABLET BY MOUTH ONCE DAILY 3/20/18  Yes Historical Provider, MD   Cholecalciferol (VITAMIN D3) 3000 units TABS Take 1,000 Units by mouth   Yes Historical Provider, MD   clonazePAM (KLONOPIN) 1 MG tablet TAKE 1 TABLET BY MOUTH EVERY 8 HOURS 9/26/18  Yes Historical Provider, MD   memantine (NAMENDA) 5 MG tablet TAKE 1 TABLET BY MOUTH TWICE DAILY 9/26/18  Yes Historical Provider, MD   lisinopril (PRINIVIL;ZESTRIL) 20 MG tablet TAKE 1 TABLET BY MOUTH ONCE DAILY 8/27/18  Yes Historical Provider, MD   metFORMIN (GLUCOPHAGE-XR) 500 MG extended release tablet 2 po bid 1/25/18  Yes Historical Provider, MD   PARoxetine (PAXIL) 40 MG tablet TAKE 1/2 (ONE-HALF) TABLET BY MOUTH ONCE DAILY FOR 6 DAYS.  THEN TAKE 1 TABLET ONCE DAILY THEREAFTER 7/12/18  Yes Historical Provider, MD   colestipol (COLESTID) 1 g tablet ONE TABLET BY MOUTH THREE TIMES A DAY  MAY TAKE 4 TIMES A DAY AS NEEDED FOR DIARREA 8/9/18  Yes Historical Provider, MD   donepezil (ARICEPT) 5 MG tablet TAKE 1 TABLET BY MOUTH AT BEDTIME 9/13/18  Yes Historical Provider, MD   glimepiride (AMARYL) 4 MG tablet TAKE ONE TABLET BY MOUTH TWICE DAILY 3/20/18  Yes Historical Provider, MD   traZODone (DESYREL) 50 MG tablet TAKE 1 TABLET BY MOUTH NIGHTLY 9/27/18  Yes Historical Provider, MD       Past Surgical History:   Procedure Laterality Date    COLONOSCOPY      CYSTOSCOPY      DENTAL SURGERY      EYE SURGERY      [facility] CYSTOU[facility]ROSCOPY N/A 11/29/2018    CYSTOSCOPY performed by Anne Marie Napier MD at [address] [facility] OFFICE/OUTPT [facility] N/A 11/29/2018    URODYNAMICS performed by Anne Marie Napier MD at [facility] [place] N/A 3/4/2019    Interstim Sacral Nerve Implant Stage I performed by Anne Marie Napier MD at [address] [facility] N/A 3/11/2019    Interstim Sacral Nerve Implant Stage II performed by Anne Marie Napier MD at [facility]       History reviewed. No pertinent family history. Social History     Tobacco Use    Smoking status: Never Smoker    Smokeless tobacco: Never Used   Substance Use Topics    Alcohol use: Not Currently       ROS: All 14 ROS systems reviewed and pertinent positives noted above, all others negative. Lower Extremity Physical Examination:     Vitals:   Vitals:    02/03/20 1544   BP: 128/88   Pulse: 84     General: AAO x 3 in NAD. Vascular: DP and PT pulses palpable 2/4, bilateral.  CFT <3 seconds, bilateral.  Hair growth absent to the level of the digits, bilateral.  Edema present, bilateral.  Varicosities present, bilateral. Erythema absent, bilateral. Distal Rubor absent bilateral.  Temperature decreased bilateral. Hyperpigmentation present bilateral. Atrophic skin yes.   Neurological: Sensation Impaired to light touch to level of digits, bilateral.  Protective sensation intact  5/10 sites via 5.07/10g [place] Monofilament,

## 2020-02-10 ENCOUNTER — OFFICE VISIT (OUTPATIENT)
Dept: UROLOGY | Age: 75
End: 2020-02-10
Payer: MEDICARE

## 2020-02-10 VITALS
BODY MASS INDEX: 37.45 KG/M2 | SYSTOLIC BLOOD PRESSURE: 136 MMHG | HEIGHT: 66 IN | WEIGHT: 233 LBS | DIASTOLIC BLOOD PRESSURE: 86 MMHG | HEART RATE: 80 BPM | RESPIRATION RATE: 16 BRPM

## 2020-02-10 PROCEDURE — 1123F ACP DISCUSS/DSCN MKR DOCD: CPT | Performed by: UROLOGY

## 2020-02-10 PROCEDURE — 0509F URINE INCON PLAN DOCD: CPT | Performed by: UROLOGY

## 2020-02-10 PROCEDURE — G8484 FLU IMMUNIZE NO ADMIN: HCPCS | Performed by: UROLOGY

## 2020-02-10 PROCEDURE — 99214 OFFICE O/P EST MOD 30 MIN: CPT | Performed by: UROLOGY

## 2020-02-10 PROCEDURE — 1090F PRES/ABSN URINE INCON ASSESS: CPT | Performed by: UROLOGY

## 2020-02-10 PROCEDURE — G8400 PT W/DXA NO RESULTS DOC: HCPCS | Performed by: UROLOGY

## 2020-02-10 PROCEDURE — G8417 CALC BMI ABV UP PARAM F/U: HCPCS | Performed by: UROLOGY

## 2020-02-10 PROCEDURE — 1036F TOBACCO NON-USER: CPT | Performed by: UROLOGY

## 2020-02-10 PROCEDURE — 3017F COLORECTAL CA SCREEN DOC REV: CPT | Performed by: UROLOGY

## 2020-02-10 PROCEDURE — G8427 DOCREV CUR MEDS BY ELIG CLIN: HCPCS | Performed by: UROLOGY

## 2020-02-10 PROCEDURE — 4040F PNEUMOC VAC/ADMIN/RCVD: CPT | Performed by: UROLOGY

## 2020-02-10 RX ORDER — BUPROPION HYDROCHLORIDE 150 MG/1
150 TABLET, EXTENDED RELEASE ORAL 2 TIMES DAILY
COMMUNITY

## 2020-02-10 RX ORDER — CLOPIDOGREL BISULFATE 75 MG/1
75 TABLET ORAL DAILY
COMMUNITY

## 2020-02-10 SDOH — HEALTH STABILITY: MENTAL HEALTH: HOW OFTEN DO YOU HAVE A DRINK CONTAINING ALCOHOL?: NEVER

## 2020-02-10 NOTE — PROGRESS NOTES
[facility]  Dept: [phone]  Dept Fax: [phone]  Loc: [phone]    GURINDER Sy, [address] [facility] Urology Office Note - follow up Patient    Patient:  Edwin Soni  YOB: 1945  Date: 2/19/2020    The patient is a 75 y.o. female who presents today for evaluation of the following problems:   Chief Complaint   Patient presents with    Incontinence     6 mo f/u    referred/consultation requested by Ankit Vyas MD.    HISTORY OF PRESENT ILLNESS:     incontinence  Onset was  Years ago  Overall, the problem(s) are worse  Severity is described as moderate to severe. Associated Symptoms: No dysuria, no gross hematuria. Current Pain Severity: 0    Has seen improvement with interstim  Still with some stress incontinence  OAB worsening. On ditropan. Still bothered by nighttime incontinence. Summary of Previous Records:  Hx of sling by Dr Amando Persaud  Has had continued incontinence  Failed anticholinergics  Very unappy about urinary symptoms  No recent UTI's        Requested/reviewed records from Dr Amando Persaud office and/or outside [de-identified]    (Patient's old records have been requested, reviewed and pertinent findings summarized in today's note.)    Procedures Today: N/A    Last several PSA's:  No results found for: PSA    Last total testosterone:  No results found for: TESTOSTERONE    Urinalysis today:  No results found for this visit on 02/10/20. Last BUN and creatinine:  No results found for: BUN  No results found for: CREATININE    Additional Lab/Culture results: none    Imaging Reviewed during this Office Visit:   imaging independently reviewed by Dayton Hill MD and radiology report verified demonstrating     No results found.       PAST MEDICAL, FAMILY AND SOCIAL HISTORY:  Past Medical History:   Diagnosis Date    Dementia ([facility] [address]     DMII (diabetes mellitus, type 2) ([facility] [address]     Hyperlipemia     Hypertension     Hypertension     Sleep apnea      Past Surgical History:   Procedure Laterality Date    COLONOSCOPY      CYSTOSCOPY      DENTAL SURGERY      EYE SURGERY      [facility] CYSTOURETHROSCOPY N/A 11/29/2018    CYSTOSCOPY performed by Karl Kelsey MD at [address] [facility] OFFICE/OUTPT [facility] N/A 11/29/2018    URODYNAMICS performed by Karl Kelsey MD at [address] [facility]. N/A 3/4/2019    Interstim Sacral Nerve Implant Stage I performed by Karl Kelsey MD at [address] [facility] N/A 3/11/2019    Interstim Sacral Nerve Implant Stage II performed by Karl Kelsey MD at [facility] OR     No family history on file. Outpatient Medications Marked as Taking for the 2/10/20 encounter (Office Visit) with Karl Kelsey MD   Medication Sig Dispense Refill    buPROPion [facility] SR) 150 MG extended release tablet Take 150 mg by mouth 2 times daily      clopidogrel (PLAVIX) 75 MG tablet Take 75 mg by mouth daily      albuterol sulfate HFA (VENTOLIN HFA) 108 (90 Base) MCG/ACT inhaler Inhale 2 puffs into the lungs every 6 hours as needed for Wheezing or Shortness of Breath 1 Inhaler 11    Multiple Vitamin (MULTI-VITAMIN DAILY PO) Take by mouth      atorvastatin (LIPITOR) 40 MG tablet TAKE ONE-HALF TABLET BY MOUTH ONCE DAILY      Cholecalciferol (VITAMIN D3) 3000 units TABS Take 1,000 Units by mouth      clonazePAM (KLONOPIN) 1 MG tablet TAKE 1 TABLET BY MOUTH EVERY 8 HOURS      memantine (NAMENDA) 5 MG tablet TAKE 1 TABLET BY MOUTH TWICE DAILY      lisinopril (PRINIVIL;ZESTRIL) 20 MG tablet TAKE 1 TABLET BY MOUTH ONCE DAILY      metFORMIN (GLUCOPHAGE-XR) 500 MG extended release tablet 2 po bid      PARoxetine (PAXIL) 40 MG tablet TAKE 1/2 (ONE-HALF) TABLET BY MOUTH ONCE DAILY FOR 6 DAYS.  THEN TAKE 1 TABLET ONCE DAILY THEREAFTER      colestipol (COLESTID) 1 g tablet ONE TABLET BY MOUTH THREE TIMES A DAY  MAY TAKE 4 TIMES A DAY AS NEEDED FOR DIARREA      donepezil (ARICEPT) 5 MG tablet TAKE 1 TABLET BY MOUTH AT BEDTIME      glimepiride (AMARYL) 4 MG tablet TAKE ONE TABLET BY MOUTH TWICE DAILY      traZODone (DESYREL) 50 MG tablet TAKE 1 TABLET BY MOUTH NIGHTLY         Sulfa antibiotics  Social History     Tobacco Use   Smoking Status Never Smoker   Smokeless Tobacco Never Used      (If patient a smoker, smoking cessation counseling offered)   Social History     Substance and Sexual Activity   Alcohol Use Not Currently    Frequency: Never    Binge frequency: Never       REVIEW OF SYSTEMS:  Constitutional: negative  Eyes: negative  Respiratory: negative  Cardiovascular: negative  Gastrointestinal: negative  Genitourinary: see HPI  Musculoskeletal: negative  Skin: negative   Neurological: negative  Hematological/Lymphatic: negative  Psychological: negative      Physical Exam:    This a 75 y.o. female  Vitals:    02/10/20 1155   BP: 136/86   Pulse: 80   Resp: 16     Body mass index is 37.61 kg/m². Constitutional: Patient in no acute distress;         Assessment and Plan        1. Mixed incontinence    2. Nocturnal enuresis    3. Recurrent UTI               Plan:      Stop ditropan/oxybutynin. mirabegron was not effective  noctural enuresis is most bothersome issue. Recommend using CPAP  Managing interstim well  Do not treat infections until they are symptomatic  botox 100 units MAC. Retention risk discussed.           Vandana Larkin MD

## 2020-02-11 ENCOUNTER — TELEPHONE (OUTPATIENT)
Dept: UROLOGY | Age: 75
End: 2020-02-11

## 2020-02-13 ENCOUNTER — HOSPITAL ENCOUNTER (OUTPATIENT)
Age: 75
Setting detail: SPECIMEN
Discharge: HOME | End: 2020-02-13
Payer: MEDICARE

## 2020-02-13 PROCEDURE — 87077 CULTURE AEROBIC IDENTIFY: CPT

## 2020-02-13 PROCEDURE — 87186 SC STD MICRODIL/AGAR DIL: CPT

## 2020-02-13 PROCEDURE — 87088 URINE BACTERIA CULTURE: CPT

## 2020-02-13 PROCEDURE — 87086 URINE CULTURE/COLONY COUNT: CPT

## 2020-02-16 LAB
CULTURE: ABNORMAL
CULTURE: ABNORMAL
Lab: ABNORMAL
SPECIMEN DESCRIPTION: ABNORMAL

## 2020-02-17 ENCOUNTER — TELEPHONE (OUTPATIENT)
Dept: UROLOGY | Age: 75
End: 2020-02-17

## 2020-02-17 RX ORDER — CEPHALEXIN 500 MG/1
500 CAPSULE ORAL 3 TIMES DAILY
Qty: 21 CAPSULE | Refills: 0 | Status: SHIPPED | OUTPATIENT
Start: 2020-02-17 | End: 2020-02-24

## 2020-02-17 NOTE — TELEPHONE ENCOUNTER
Patient was to see cardio at [facility] but cancelled, contacted patient is scheduled 2/27/20

## 2020-02-17 NOTE — TELEPHONE ENCOUNTER
Per Dr. Carlos Duong patient to take keflex 500mg TID x7days contacted patient medication sent to [address] [facility] in [place]

## 2020-03-09 ENCOUNTER — TELEPHONE (OUTPATIENT)
Dept: UROLOGY | Age: 75
End: 2020-03-09

## 2020-03-09 NOTE — TELEPHONE ENCOUNTER
Patient's  called and wants to speak with the nurse today. Call him back at [phone].   He wants to know status of BOTOX Procedure

## 2020-04-21 RX ORDER — ALBUTEROL SULFATE 90 UG/1
AEROSOL, METERED RESPIRATORY (INHALATION)
Qty: 18 G | Refills: 0 | Status: SHIPPED | OUTPATIENT
Start: 2020-04-21

## 2020-06-30 ENCOUNTER — OFFICE VISIT (OUTPATIENT)
Dept: PODIATRY | Age: 75
End: 2020-06-30
Payer: MEDICARE

## 2020-06-30 VITALS
BODY MASS INDEX: 34.86 KG/M2 | WEIGHT: 216 LBS | DIASTOLIC BLOOD PRESSURE: 72 MMHG | RESPIRATION RATE: 20 BRPM | SYSTOLIC BLOOD PRESSURE: 124 MMHG | HEART RATE: 80 BPM

## 2020-06-30 PROCEDURE — 11721 DEBRIDE NAIL 6 OR MORE: CPT | Performed by: PODIATRIST

## 2020-06-30 PROCEDURE — 99999 PR OFFICE/OUTPT VISIT,PROCEDURE ONLY: CPT | Performed by: PODIATRIST

## 2020-06-30 NOTE — PROGRESS NOTES
(ARICEPT) 5 MG tablet TAKE 1 TABLET BY MOUTH AT BEDTIME 9/13/18  Yes Historical Provider, MD   glimepiride (AMARYL) 4 MG tablet TAKE ONE TABLET BY MOUTH TWICE DAILY 3/20/18  Yes Historical Provider, MD   traZODone (DESYREL) 50 MG tablet TAKE 1 TABLET BY MOUTH NIGHTLY 9/27/18  Yes Historical Provider, MD       Past Surgical History:   Procedure Laterality Date    COLONOSCOPY      CYSTOSCOPY      DENTAL SURGERY      EYE SURGERY      CT CYSTOURETHROSCOPY N/A 11/29/2018    CYSTOSCOPY performed by Eugenio Hernandez MD at [address] [facility] OFFICE/OUTPT [facility] [facility] N/A 11/29/2018    URODYNAMICS performed by Eugenio Hernandez MD at [address] [facility]. N/A 3/4/2019    Interstim Sacral Nerve Implant Stage I performed by Eugenio Hernandez MD at [address] [facility] N/A 3/11/2019    Interstim Sacral Nerve Implant Stage II performed by Eugenio Hernandez MD at [address] [facility] reviewed. No pertinent family history. Social History     Tobacco Use    Smoking status: Never Smoker    Smokeless tobacco: Never Used   Substance Use Topics    Alcohol use: Not Currently     Frequency: Never     Binge frequency: Never       ROS: All 14 ROS systems reviewed and pertinent positives noted above, all others negative. Lower Extremity Physical Examination:     Vitals:   Vitals:    06/30/20 1353   BP: 124/72   Pulse: 80   Resp: 20     General: AAO x 3 in NAD. Vascular: DP and PT pulses palpable 2/4, bilateral.  CFT <3 seconds, bilateral.  Hair growth absent to the level of the digits, bilateral.  Edema present, bilateral.  Varicosities present, bilateral. Erythema absent, bilateral. Distal Rubor absent bilateral.  Temperature decreased bilateral. Hyperpigmentation present bilateral. Atrophic skin yes.   Neurological: Sensation Impaired to light touch to level of digits, bilateral.  Protective sensation intact  5/10 sites via 5.07/10g [place]Ell[place] Monofilament, bilateral.  negative Tinel's,

## 2020-08-12 ENCOUNTER — OFFICE VISIT (OUTPATIENT)
Dept: PULMONOLOGY | Age: 75
End: 2020-08-12
Payer: MEDICARE

## 2020-08-12 VITALS
HEART RATE: 85 BPM | DIASTOLIC BLOOD PRESSURE: 78 MMHG | TEMPERATURE: 97.3 F | BODY MASS INDEX: 35.52 KG/M2 | OXYGEN SATURATION: 92 % | SYSTOLIC BLOOD PRESSURE: 126 MMHG | HEIGHT: 66 IN | WEIGHT: 221 LBS

## 2020-08-12 PROCEDURE — 1123F ACP DISCUSS/DSCN MKR DOCD: CPT | Performed by: NURSE PRACTITIONER

## 2020-08-12 PROCEDURE — G8926 SPIRO NO PERF OR DOC: HCPCS | Performed by: NURSE PRACTITIONER

## 2020-08-12 PROCEDURE — G8427 DOCREV CUR MEDS BY ELIG CLIN: HCPCS | Performed by: NURSE PRACTITIONER

## 2020-08-12 PROCEDURE — 3023F SPIROM DOC REV: CPT | Performed by: NURSE PRACTITIONER

## 2020-08-12 PROCEDURE — 3017F COLORECTAL CA SCREEN DOC REV: CPT | Performed by: NURSE PRACTITIONER

## 2020-08-12 PROCEDURE — 1036F TOBACCO NON-USER: CPT | Performed by: NURSE PRACTITIONER

## 2020-08-12 PROCEDURE — G8400 PT W/DXA NO RESULTS DOC: HCPCS | Performed by: NURSE PRACTITIONER

## 2020-08-12 PROCEDURE — 99213 OFFICE O/P EST LOW 20 MIN: CPT | Performed by: NURSE PRACTITIONER

## 2020-08-12 PROCEDURE — 4040F PNEUMOC VAC/ADMIN/RCVD: CPT | Performed by: NURSE PRACTITIONER

## 2020-08-12 PROCEDURE — G8417 CALC BMI ABV UP PARAM F/U: HCPCS | Performed by: NURSE PRACTITIONER

## 2020-08-12 PROCEDURE — 1090F PRES/ABSN URINE INCON ASSESS: CPT | Performed by: NURSE PRACTITIONER

## 2020-08-12 PROCEDURE — 99213 OFFICE O/P EST LOW 20 MIN: CPT

## 2020-08-12 RX ORDER — DULAGLUTIDE 0.75 MG/.5ML
0.75 INJECTION, SOLUTION SUBCUTANEOUS WEEKLY
COMMUNITY
Start: 2020-08-08

## 2020-08-12 ASSESSMENT — ENCOUNTER SYMPTOMS
ALLERGIC/IMMUNOLOGIC NEGATIVE: 1
GASTROINTESTINAL NEGATIVE: 1
EYES NEGATIVE: 1

## 2020-08-31 ENCOUNTER — OFFICE VISIT (OUTPATIENT)
Dept: UROLOGY | Age: 75
End: 2020-08-31
Payer: MEDICARE

## 2020-08-31 VITALS
RESPIRATION RATE: 16 BRPM | DIASTOLIC BLOOD PRESSURE: 66 MMHG | HEART RATE: 81 BPM | WEIGHT: 221 LBS | BODY MASS INDEX: 35.52 KG/M2 | HEIGHT: 66 IN | TEMPERATURE: 97.7 F | SYSTOLIC BLOOD PRESSURE: 102 MMHG | OXYGEN SATURATION: 93 %

## 2020-08-31 PROCEDURE — 1123F ACP DISCUSS/DSCN MKR DOCD: CPT | Performed by: UROLOGY

## 2020-08-31 PROCEDURE — G8400 PT W/DXA NO RESULTS DOC: HCPCS | Performed by: UROLOGY

## 2020-08-31 PROCEDURE — 99215 OFFICE O/P EST HI 40 MIN: CPT

## 2020-08-31 PROCEDURE — G8417 CALC BMI ABV UP PARAM F/U: HCPCS | Performed by: UROLOGY

## 2020-08-31 PROCEDURE — 0509F URINE INCON PLAN DOCD: CPT | Performed by: UROLOGY

## 2020-08-31 PROCEDURE — 4040F PNEUMOC VAC/ADMIN/RCVD: CPT | Performed by: UROLOGY

## 2020-08-31 PROCEDURE — G8427 DOCREV CUR MEDS BY ELIG CLIN: HCPCS | Performed by: UROLOGY

## 2020-08-31 PROCEDURE — 99214 OFFICE O/P EST MOD 30 MIN: CPT | Performed by: UROLOGY

## 2020-08-31 PROCEDURE — 1090F PRES/ABSN URINE INCON ASSESS: CPT | Performed by: UROLOGY

## 2020-08-31 PROCEDURE — 1036F TOBACCO NON-USER: CPT | Performed by: UROLOGY

## 2020-08-31 NOTE — PROGRESS NOTES
[facility]  Dept: [phone]  Dept Fax: [phone]  Loc: [phone]    GURINDER Simmons, [address] [facility] Urology Office Note - follow up Patient    Patient:  Mateo Smart  YOB: 1945  Date: 8/31/2020    The patient is a 67 y.o. female who presents today for evaluation of the following problems:   Chief Complaint   Patient presents with    Incontinence     6 mo f/u    referred/consultation requested by Khai Oleary MD.    HISTORY OF PRESENT ILLNESS:     incontinence  Onset was  Years ago  Overall, the problem(s) are worse  Severity is described as moderate to severe. Associated Symptoms: No dysuria, no gross hematuria. Current Pain Severity: 0    Still with some stress incontinence  OAB worsening. Has interstim but not effective. Failed medications. Here to discuss botox      Summary of Previous Records:  Hx of sling by Dr Gabriel Pedersen  Has had continued incontinence  Failed anticholinergics  Very unappy about urinary symptoms  No recent UTI's        Requested/reviewed records from Dr Gabriel Pedersen office and/or outside [de-identified]    (Patient's old records have been requested, reviewed and pertinent findings summarized in today's note.)    Procedures Today: N/A    Last several PSA's:  No results found for: PSA    Last total testosterone:  No results found for: TESTOSTERONE    Urinalysis today:  No results found for this visit on 08/31/20. Last BUN and creatinine:  No results found for: BUN  No results found for: CREATININE    Additional Lab/Culture results: none    Imaging Reviewed during this Office Visit:   imaging independently reviewed by Rachael Nunez MD and radiology report verified demonstrating     No results found.       PAST MEDICAL, FAMILY AND SOCIAL HISTORY:  Past Medical History:   Diagnosis Date    Dementia ([facility] [address]     DMII (diabetes mellitus, type 2) ([facility] [address]     Hyperlipemia     Hypertension     DAILY      traZODone (DESYREL) 50 MG tablet TAKE 1 TABLET BY MOUTH NIGHTLY         Sulfa antibiotics  Social History     Tobacco Use   Smoking Status Never Smoker   Smokeless Tobacco Never Used      (If patient a smoker, smoking cessation counseling offered)   Social History     Substance and Sexual Activity   Alcohol Use Not Currently    Frequency: Never    Binge frequency: Never       REVIEW OF SYSTEMS:  Constitutional: negative  Eyes: negative  Respiratory: negative  Cardiovascular: negative  Gastrointestinal: negative  Genitourinary: see HPI  Musculoskeletal: negative  Skin: negative   Neurological: negative  Hematological/Lymphatic: negative  Psychological: negative      Physical Exam:    This a 67 y.o. female  Vitals:    08/31/20 1137   BP: 102/66   Pulse: 81   Resp: 16   Temp: 97.7 °F (36.5 °C)   SpO2: 93%     Body mass index is 35.67 kg/m². Constitutional: Patient in no acute distress;         Assessment and Plan        1. Mixed incontinence    2. Nocturnal enuresis    3. Recurrent UTI               Plan:      Off ditropan/oxybutynin. mirabegron was not effective  noctural enuresis is most bothersome issue. Recommend using CPAP  Managing interstim but it's not helpful any longer  Do not treat infections until they are symptomatic  botox 100 units MAC. Retention risk discussed.           Fer Vergara MD

## 2020-10-19 ENCOUNTER — OFFICE VISIT (OUTPATIENT)
Dept: CARDIOLOGY | Age: 75
End: 2020-10-19
Payer: MEDICARE

## 2020-10-19 ENCOUNTER — TELEPHONE (OUTPATIENT)
Dept: PREADMISSION TESTING | Age: 75
End: 2020-10-19

## 2020-10-19 VITALS
HEART RATE: 88 BPM | HEIGHT: 66 IN | BODY MASS INDEX: 35.36 KG/M2 | SYSTOLIC BLOOD PRESSURE: 136 MMHG | WEIGHT: 220 LBS | DIASTOLIC BLOOD PRESSURE: 66 MMHG

## 2020-10-19 PROCEDURE — G8484 FLU IMMUNIZE NO ADMIN: HCPCS | Performed by: INTERNAL MEDICINE

## 2020-10-19 PROCEDURE — 1090F PRES/ABSN URINE INCON ASSESS: CPT | Performed by: INTERNAL MEDICINE

## 2020-10-19 PROCEDURE — 1036F TOBACCO NON-USER: CPT | Performed by: INTERNAL MEDICINE

## 2020-10-19 PROCEDURE — 4040F PNEUMOC VAC/ADMIN/RCVD: CPT | Performed by: INTERNAL MEDICINE

## 2020-10-19 PROCEDURE — G8417 CALC BMI ABV UP PARAM F/U: HCPCS | Performed by: INTERNAL MEDICINE

## 2020-10-19 PROCEDURE — 93010 ELECTROCARDIOGRAM REPORT: CPT | Performed by: INTERNAL MEDICINE

## 2020-10-19 PROCEDURE — 1123F ACP DISCUSS/DSCN MKR DOCD: CPT | Performed by: INTERNAL MEDICINE

## 2020-10-19 PROCEDURE — 99204 OFFICE O/P NEW MOD 45 MIN: CPT | Performed by: INTERNAL MEDICINE

## 2020-10-19 PROCEDURE — G8427 DOCREV CUR MEDS BY ELIG CLIN: HCPCS | Performed by: INTERNAL MEDICINE

## 2020-10-19 PROCEDURE — 93005 ELECTROCARDIOGRAM TRACING: CPT | Performed by: INTERNAL MEDICINE

## 2020-10-19 PROCEDURE — 99204 OFFICE O/P NEW MOD 45 MIN: CPT

## 2020-10-19 PROCEDURE — G8400 PT W/DXA NO RESULTS DOC: HCPCS | Performed by: INTERNAL MEDICINE

## 2020-10-19 RX ORDER — OXYBUTYNIN CHLORIDE 15 MG/1
15 TABLET, EXTENDED RELEASE ORAL DAILY
COMMUNITY
Start: 2020-09-22

## 2020-10-19 NOTE — PROGRESS NOTES
Cardiology Consultation/Follow Up. [facility]    CC: Patient is here for new consult Re: Preop risk for Cystoscopy    HPI  Shaq Sorensen  Is here as new consultation for preop risk for cystoscopy with botox. Has history of HTN, DM2, DL, VÍCTOR. Not very active. Can usually only walk to bed and den. Currently in a wheelchair. Past Medical:  Past Medical History:   Diagnosis Date    [facility] [address]     DMII (diabetes mellitus, type 2) ([facility] [address]     Hyperlipemia     Hypertension     Hypertension     Sleep apnea        Past Surgical:  Past Surgical History:   Procedure Laterality Date    COLONOSCOPY      CYSTOSCOPY      DENTAL SURGERY      EYE SURGERY      [place] CYSTOURE[facility]ROSCOPY N/A 11/29/2018    CYSTOSCOPY performed by Mckayla Li MD at [address] [facility] OFFICE/OUTPT [facility] N/A 11/29/2018    URODYNAMICS performed by Mckayla Li MD at [address] [facility] [address] N/A 3/4/2019    Interstim Sacral Nerve Implant Stage I performed by Mckayla Li MD at [address] [facility] N/A 3/11/2019    Interstim Sacral Nerve Implant Stage II performed by Mckayla Li MD at [address] [facility]       Family History:  No family history on file. Social History:  Social History     Tobacco Use    Smoking status: Never Smoker    Smokeless tobacco: Never Used   Substance Use Topics    Alcohol use: Not Currently     Frequency: Never     Binge frequency: Never    Drug use: Never        REVIEW OF SYSTEMS:    · Constitutional: there has been no unanticipated weight loss. There's been No change in energy level, No change in activity level. · Eyes: No visual changes or diplopia. No scleral icterus. · ENT: No Headaches, hearing loss or vertigo. No mouth sores or sore throat. · Cardiovascular: AS HPI  · Respiratory: AS HPI  · Gastrointestinal: No abdominal pain, appetite loss, blood in stools. No change in bowel or bladder habits.   · Genitourinary: No dysuria, trouble voiding, or hematuria. · Musculoskeletal:  No gait disturbance, No weakness or joint complaints. · Integumentary: No rash or pruritis. · Neurological: No headache, diplopia, change in muscle strength, numbness or tingling. No change in gait, balance, coordination, mood, affect, memory, mentation, behavior. · Psychiatric: No new anxiety or depression. · Endocrine: No temperature intolerance. No excessive thirst, fluid intake, or urination. No tremor. · Hematologic/Lymphatic: No abnormal bruising or bleeding, blood clots or swollen lymph nodes. · Allergic/Immunologic: No nasal congestion or hives. Medications:  Current Outpatient Medications   Medication Sig Dispense Refill    oxybutynin (DITROPAN XL) 15 MG extended release tablet Take 15 mg by mouth daily      TRULICITY 5.38 XR/5.8AN SOPN Inject 0.75 mg into the skin once a week       albuterol sulfate  (90 Base) MCG/ACT inhaler INHALE 2 PUFFS BY MOUTH EVERY 6 HOURS AS NEEDED FOR WHEEZING AND FOR SHORTNESS OF BREATH 18 g 0    buPROPion (WELLBUTRIN SR) 150 MG extended release tablet Take 150 mg by mouth 2 times daily      clopidogrel (PLAVIX) 75 MG tablet Take 75 mg by mouth daily      Multiple Vitamin (MULTI-VITAMIN DAILY PO) Take by mouth      atorvastatin (LIPITOR) 40 MG tablet TAKE ONE-HALF TABLET BY MOUTH ONCE DAILY      Cholecalciferol (VITAMIN D3) 3000 units TABS Take 1,000 Units by mouth      clonazePAM (KLONOPIN) 1 MG tablet TAKE 1 TABLET BY MOUTH EVERY 8 HOURS      memantine (NAMENDA) 5 MG tablet TAKE 1 TABLET BY MOUTH TWICE DAILY      lisinopril (PRINIVIL;ZESTRIL) 20 MG tablet TAKE 1 TABLET BY MOUTH ONCE DAILY      metFORMIN (GLUCOPHAGE-XR) 500 MG extended release tablet 2 po bid      PARoxetine (PAXIL) 40 MG tablet TAKE 1/2 (ONE-HALF) TABLET BY MOUTH ONCE DAILY FOR 6 DAYS.  THEN TAKE 1 TABLET ONCE DAILY THEREAFTER      colestipol (COLESTID) 1 g tablet as needed       donepezil (ARICEPT) 5 MG tablet TAKE 1 TABLET BY MOUTH AT BEDTIME      glimepiride (AMARYL) 4 MG tablet TAKE ONE TABLET BY MOUTH TWICE DAILY      traZODone (DESYREL) 50 MG tablet TAKE 1 TABLET BY MOUTH NIGHTLY       No current facility-administered medications for this visit. Physical Exam:   Vitals: /66 (Site: Right Upper Arm, Position: Sitting, Cuff Size: Large Adult)   Pulse 88   Ht 5' 6\" (1.676 m)   Wt 220 lb (99.8 kg)   BMI 35.51 kg/m²   General appearance: alert and cooperative with exam  HEENT: Head: Normocephalic, no lesions, without obvious abnormality. Neck: no carotid bruit, no JVD  Lungs: clear to auscultation bilaterally  Heart: regular rate and rhythm, S1, S2 normal, no murmur, click, rub or gallop  Abdomen: soft, non-tender; bowel sounds normal; no masses,  no organomegaly  Extremities: extremities normal, atraumatic, no cyanosis or edema  Neurologic: Mental status: Alert, oriented, thought content appropriate    Labs:  No results found for: CHOL, TRIG, HDL, LDLCHOLESTEROL, LDLCALC, LABVLDL, VLDL, CHOLHDLRATIO    No results found for: NA, K, CL, CO2, BUN, CREATININE, GLUCOSE, CALCIUM, PROT, LABALBU, BILITOT, ALKPHOS, AST, ALT, LABGLOM, GFRAA, AGRATIO, GLOB    EKG:   Sinus  Rhythm   Low voltage in precordial leads.    -Old inferior infarct  -Poor R-wave progression -may be secondary to pulmonary disease   consider old anterior infarct. LAST ECHO 2/20 at [facility]:     Left Ventricle: Systolic function is normal with an ejection fraction   of 55-60%. LAST STRESS:   No results found for this or any previous visit. LAST CATH:   No results found for this or any previous visit. Past Medical and Surgical History, Problem List, Allergies, Medications, Labs, Imaging, all reviewed extensively in EMR and with the patient.     Assessment:  - Preop risk for cystoscopy  - GONZALEZ, abnormal ECG, and poor functional status- concern for angina  - Preserved LVEF Echo 2/20 at [facility] with no mention of valve disease  - HTN  - DL  - DM2  -

## 2020-10-28 ENCOUNTER — TELEPHONE (OUTPATIENT)
Dept: UROLOGY | Age: 75
End: 2020-10-28

## 2020-10-28 NOTE — TELEPHONE ENCOUNTER
Patient would like to cancel the cysto with botox on 11/16 with Dr. Cedrick Martin. She would like to wait until next year.

## 2023-03-14 NOTE — PROGRESS NOTES
ICD-10-CM    1. Non-recurrent acute serous otitis media of both ears  H65.03     the TMs are flat, likely eustachian tuvbe dysfunction after ear infectiojn,. expect this to clear over the next 2-3 weeks. return for recurrent fever      2. Viral exanthem  B09     non-specific supportive care. hydration with fluids. tylenol or motrin for fever.  may return to  when affebrile and symptoms candace           ONE-HALF TABLET BY MOUTH ONCE DAILY      buPROPion (WELLBUTRIN SR) 150 MG extended release tablet 1 po qd x 3 days, then 1 po bid      Cholecalciferol (VITAMIN D3) 3000 units TABS Take 1,000 Units by mouth      clonazePAM (KLONOPIN) 1 MG tablet TAKE 1 TABLET BY MOUTH EVERY 8 HOURS      clopidogrel (PLAVIX) 75 MG tablet TAKE 1 TABLET BY MOUTH ONCE DAILY      memantine (NAMENDA) 5 MG tablet TAKE 1 TABLET BY MOUTH TWICE DAILY      lisinopril (PRINIVIL;ZESTRIL) 20 MG tablet TAKE 1 TABLET BY MOUTH ONCE DAILY      metFORMIN (GLUCOPHAGE-XR) 500 MG extended release tablet 2 po bid      PARoxetine (PAXIL) 40 MG tablet TAKE 1/2 (ONE-HALF) TABLET BY MOUTH ONCE DAILY FOR 6 DAYS. THEN TAKE 1 TABLET ONCE DAILY THEREAFTER      colestipol (COLESTID) 1 g tablet ONE TABLET BY MOUTH THREE TIMES A DAY  MAY TAKE 4 TIMES A DAY AS NEEDED FOR DIARREA      donepezil (ARICEPT) 5 MG tablet TAKE 1 TABLET BY MOUTH AT BEDTIME      glimepiride (AMARYL) 4 MG tablet TAKE ONE TABLET BY MOUTH TWICE DAILY      traZODone (DESYREL) 50 MG tablet TAKE 1 TABLET BY MOUTH NIGHTLY         Sulfa antibiotics  History   Smoking Status    Never Smoker   Smokeless Tobacco    Never Used      (If patient a smoker, smoking cessation counseling offered)   History   Alcohol use Not on file       REVIEW OF SYSTEMS:  Constitutional: negative  Eyes: negative  Respiratory: negative  Cardiovascular: negative  Gastrointestinal: negative  Genitourinary: see HPI  Musculoskeletal: negative  Skin: negative   Neurological: negative  Hematological/Lymphatic: negative  Psychological: negative      Physical Exam:    This a 75 y.o. female  Vitals:    10/01/18 1140   BP: 124/70   Pulse: 90   SpO2: 92%     There is no height or weight on file to calculate BMI. Constitutional: Patient in no acute distress; Neuro: alert and oriented to person place and time.     Psych: Mood and affect normal.  Skin: warm, dry  Lungs: Respiratory effort normal, Symmetric chest

## 2023-10-31 ENCOUNTER — OUTSIDE SERVICES (OUTPATIENT)
Dept: INTERNAL MEDICINE | Age: 78
End: 2023-10-31

## 2023-10-31 DIAGNOSIS — M19.90 OSTEOARTHRITIS, UNSPECIFIED OSTEOARTHRITIS TYPE, UNSPECIFIED SITE: ICD-10-CM

## 2023-10-31 DIAGNOSIS — F41.9 ANXIETY: ICD-10-CM

## 2023-10-31 DIAGNOSIS — E11.9 TYPE 2 DIABETES MELLITUS WITHOUT COMPLICATION, UNSPECIFIED WHETHER LONG TERM INSULIN USE (HCC): ICD-10-CM

## 2023-10-31 DIAGNOSIS — N18.4 CHRONIC KIDNEY DISEASE, STAGE IV (SEVERE) (HCC): ICD-10-CM

## 2023-10-31 DIAGNOSIS — F03.918 UNSPECIFIED DEMENTIA, UNSPECIFIED SEVERITY, WITH OTHER BEHAVIORAL DISTURBANCE (HCC): ICD-10-CM

## 2023-10-31 DIAGNOSIS — M62.81 MUSCLE WEAKNESS (GENERALIZED): ICD-10-CM

## 2023-10-31 DIAGNOSIS — I10 HYPERTENSION, UNSPECIFIED TYPE: ICD-10-CM

## 2023-10-31 DIAGNOSIS — E66.01 CLASS 2 SEVERE OBESITY DUE TO EXCESS CALORIES WITH SERIOUS COMORBIDITY AND BODY MASS INDEX (BMI) OF 37.0 TO 37.9 IN ADULT (HCC): ICD-10-CM

## 2023-10-31 DIAGNOSIS — Z91.81 PERSONAL HISTORY OF FALL: ICD-10-CM

## 2023-10-31 DIAGNOSIS — R09.02 HYPOXIA: ICD-10-CM

## 2023-10-31 DIAGNOSIS — R25.1 TREMOR: ICD-10-CM

## 2023-10-31 DIAGNOSIS — R53.83 FATIGUE, UNSPECIFIED TYPE: ICD-10-CM

## 2023-10-31 DIAGNOSIS — R41.841 COGNITIVE COMMUNICATION DEFICIT: ICD-10-CM

## 2023-10-31 DIAGNOSIS — F03.918 UNSPECIFIED DEMENTIA, UNSPECIFIED SEVERITY, WITH OTHER BEHAVIORAL DISTURBANCE (HCC): Primary | ICD-10-CM

## 2023-10-31 DIAGNOSIS — K59.00 CONSTIPATION, UNSPECIFIED CONSTIPATION TYPE: ICD-10-CM

## 2023-10-31 DIAGNOSIS — J18.9 COMMUNITY ACQUIRED PNEUMONIA OF RIGHT LOWER LOBE OF LUNG: ICD-10-CM

## 2023-10-31 DIAGNOSIS — F32.9 MAJOR DEPRESSIVE DISORDER, REMISSION STATUS UNSPECIFIED, UNSPECIFIED WHETHER RECURRENT: ICD-10-CM

## 2023-10-31 DIAGNOSIS — E78.5 HYPERLIPIDEMIA, UNSPECIFIED HYPERLIPIDEMIA TYPE: Primary | ICD-10-CM

## 2023-10-31 DIAGNOSIS — M25.561 RIGHT KNEE PAIN, UNSPECIFIED CHRONICITY: ICD-10-CM

## 2023-10-31 DIAGNOSIS — R26.81 UNSTEADINESS: ICD-10-CM

## 2023-10-31 DIAGNOSIS — H35.3121 EARLY STAGE DRY AGE-RELATED MACULAR DEGENERATION OF LEFT EYE: ICD-10-CM

## 2023-10-31 DIAGNOSIS — F25.9 SCHIZOAFFECTIVE DISORDER, UNSPECIFIED TYPE (HCC): ICD-10-CM

## 2023-10-31 RX ORDER — HYDROCHLOROTHIAZIDE 25 MG/1
25 TABLET ORAL DAILY
COMMUNITY

## 2023-10-31 RX ORDER — ALBUTEROL SULFATE 90 UG/1
2 AEROSOL, METERED RESPIRATORY (INHALATION) EVERY 8 HOURS PRN
COMMUNITY

## 2023-10-31 RX ORDER — RISPERIDONE 0.25 MG/1
0.25 TABLET ORAL 2 TIMES DAILY
COMMUNITY

## 2023-10-31 RX ORDER — HYDROXYZINE HYDROCHLORIDE 25 MG/1
12.5 TABLET, FILM COATED ORAL 2 TIMES DAILY
COMMUNITY

## 2023-10-31 RX ORDER — MIRTAZAPINE 7.5 MG/1
7.5 TABLET, FILM COATED ORAL DAILY
COMMUNITY

## 2023-10-31 RX ORDER — MULTIVIT WITH MINERALS/LUTEIN
250 TABLET ORAL DAILY
COMMUNITY

## 2023-10-31 RX ORDER — BUPROPION HYDROCHLORIDE 150 MG/1
150 TABLET ORAL EVERY OTHER DAY
COMMUNITY

## 2023-10-31 RX ORDER — LIRAGLUTIDE 6 MG/ML
0.6 INJECTION SUBCUTANEOUS EVERY MORNING
COMMUNITY

## 2023-10-31 RX ORDER — LANOLIN ALCOHOL/MO/W.PET/CERES
1000 CREAM (GRAM) TOPICAL DAILY
COMMUNITY

## 2023-10-31 RX ORDER — KETOCONAZOLE 20 MG/G
CREAM TOPICAL
COMMUNITY

## 2023-10-31 RX ORDER — LOPERAMIDE HYDROCHLORIDE 2 MG/1
2 CAPSULE ORAL EVERY 6 HOURS PRN
COMMUNITY

## 2023-10-31 RX ORDER — COVID-19 ANTIGEN TEST
1 KIT MISCELLANEOUS DAILY PRN
COMMUNITY

## 2023-10-31 RX ORDER — ATORVASTATIN CALCIUM 10 MG/1
10 TABLET, FILM COATED ORAL
COMMUNITY

## 2023-10-31 RX ORDER — NYSTATIN 100000 [USP'U]/G
POWDER TOPICAL
COMMUNITY

## 2023-10-31 RX ORDER — ACETAMINOPHEN 325 MG/1
650 TABLET ORAL EVERY 6 HOURS PRN
COMMUNITY

## 2023-10-31 RX ORDER — BISACODYL 10 MG
10 SUPPOSITORY, RECTAL RECTAL DAILY PRN
COMMUNITY

## 2023-10-31 RX ORDER — DEXTROMETHORPHAN HYDROBROMIDE AND GUAIFENESIN 20; 400 MG/20ML; MG/20ML
10 SOLUTION ORAL EVERY 12 HOURS PRN
COMMUNITY

## 2023-10-31 RX ORDER — MEMANTINE HYDROCHLORIDE 14 MG/1
14 CAPSULE, EXTENDED RELEASE ORAL DAILY
COMMUNITY

## 2023-10-31 RX ORDER — NICOTINE POLACRILEX 4 MG
LOZENGE BUCCAL
COMMUNITY

## 2023-10-31 RX ORDER — BUSPIRONE HYDROCHLORIDE 5 MG/1
5 TABLET ORAL DAILY
COMMUNITY

## 2023-11-01 NOTE — PROGRESS NOTES
[facility]  Date of Service: 10/31/23  Margaret Reyes  Date of Birth 1945  Code Status----Full     HPI:  65-year-old patient with progressive dementia being seen for routine monthly rounds at [facility]. Previous Dr. Rafi Jo patient. Being switched over to [facility] provider in the interim. Patient pleasantly confused, resting in bed. No family present. History obtained from nursing staff and chart. Patient's mood has been stable she is a long-term resident follows with psychiatry staff states she likes to tell everyone they are fired because she wants to go home. Staff concerned as she has had worsening cough over the last 4 weeks. Has used Mucinex and Tessalon with no improvement. Patient states it is productive but she just swallows it. Has been afebrile. Staff deny any further acute concerns. Has a good appetite no problems with bowel or bladder.         Immunization History   Administered Date(s) Administered    COVID-19, MODERNA BLUE border, Primary or Immunocompromised, (age 12y+), IM, 100 mcg/0.5mL 01/26/2021, 03/02/2021, 12/01/2021, 06/29/2022    COVID-19, MODERNA Bivalent, (age 12y+), IM, 50 mcg/0.5 mL 12/07/2022    DTaP, DAPTACEL, (age 6w-6y), IM, 0.5mL 08/27/2009    INFLUENZA, INTRADERMAL, QUADRIVALENT, PRESERVATIVE FREE 01/01/2018    Influenza A ([id number]) Vaccine PF IM 01/13/2010    Influenza Vaccine, unspecified formulation 11/01/2001, 11/21/2002, 12/11/2003, 12/12/2005    Influenza Virus Vaccine 09/26/2011, 10/07/2021    Influenza Whole 09/15/2009, 10/07/2010, 09/27/2012    Influenza, High Dose (Fluzone 65 yrs and older) 09/20/2016, 09/14/2017, 01/28/2020    Pneumococcal, PCV-13, PREVNAR 15, (age 6w+), IM, 0.5mL 09/20/2016    Pneumococcal, PPSV23, PNEUMOVAX 23, (age 2y+), SC/IM, 0.5mL 11/21/2002, 01/01/2007, 06/27/2014    Td vaccine (adult) 06/25/2001    Zoster Live (Zostavax) 12/01/2014       Allergies   Allergen Reactions    Sulfa Antibiotics Rash       Past

## 2023-12-07 LAB
ALBUMIN/GLOBULIN RATIO: 1.07 G/DL
ALBUMIN: 2.9 G/DL (ref 3.5–5)
ALP BLD-CCNC: 82 UNITS/L (ref 38–126)
ALT SERPL-CCNC: 26 UNITS/L (ref 4–35)
ANION GAP SERPL CALCULATED.3IONS-SCNC: 9 MMOL/L (ref 12–16)
AST SERPL-CCNC: 32 UNITS/L (ref 14–36)
BASOPHILS %: 1.04 (ref 0–3)
BASOPHILS ABSOLUTE: 0.06 (ref 0–0.3)
BILIRUB SERPL-MCNC: 0.5 MG/DL (ref 0.2–1.3)
BUN BLDV-MCNC: 23 MG/DL (ref 7–17)
CALCIUM SERPL-MCNC: 8.6 MG/DL (ref 8.4–10.2)
CHLORIDE BLD-SCNC: 100 MMOL/L (ref 98–120)
CHOLESTEROL/HDL RATIO: 3 RATIO (ref 0–4.5)
CHOLESTEROL: 126 MG/DL (ref 50–200)
CO2: 36 MMOL/L (ref 22–31)
CREAT SERPL-MCNC: 1.5 MG/DL (ref 0.5–1)
EOSINOPHILS %: 4 (ref 0–10)
EOSINOPHILS ABSOLUTE: 0.25 (ref 0–1.1)
GFR CALCULATED: 35.6
GLOBULIN: 2.7 G/DL
GLUCOSE: 171 MG/DL (ref 65–105)
HBA1C MFR BLD: 7.5 % (ref 4.4–6.4)
HCT VFR BLD CALC: 35.4 % (ref 37–47)
HDLC SERPL-MCNC: 36 MG/DL (ref 36–68)
HEMOGLOBIN: 10.3 (ref 12–16)
LDL CHOLESTEROL CALCULATED: 65 MG/DL (ref 0–160)
LYMPHOCYTE %: 29.99 (ref 20–51.1)
LYMPHOCYTES ABSOLUTE: 1.86 (ref 1–5.5)
MCH RBC QN AUTO: 26.7 PG (ref 28.5–32.5)
MCHC RBC AUTO-ENTMCNC: 29.2 G/DL (ref 32–37)
MCV RBC AUTO: 91.4 FL (ref 80–94)
MONOCYTES %: 10.82 (ref 1.7–9.3)
MONOCYTES ABSOLUTE: 0.67 (ref 0.1–1)
NEUTROPHILS %: 54.14 (ref 42.2–75.2)
NEUTROPHILS ABSOLUTE: 3.35 (ref 2–8.1)
PDW BLD-RTO: 12.7 % (ref 8.5–15.5)
PLATELET # BLD: 193 THOU/MM3 (ref 130–400)
POTASSIUM SERPL-SCNC: 4 MMOL/L (ref 3.6–5)
RBC: 3.88 M/UL (ref 4.2–5.4)
SODIUM BLD-SCNC: 141 MMOL/L (ref 135–145)
TOTAL PROTEIN, SERUM: 5.6 G/DL (ref 6.3–8.2)
TRIGL SERPL-MCNC: 125 MG/DL (ref 10–250)
VITAMIN B-12: > 1000 PG/ML (ref 239–931)
VITAMIN D 25-HYDROXY: 59.4 NG/ML (ref 30–100)
VLDLC SERPL CALC-MCNC: 25 MG/DL (ref 0–50)
WBC: 6.2 THOU/ML3 (ref 4.8–10.8)

## 2024-01-26 ENCOUNTER — OUTSIDE SERVICES (OUTPATIENT)
Dept: FAMILY MEDICINE CLINIC | Age: 79
End: 2024-01-26
Payer: MEDICARE

## 2024-01-26 DIAGNOSIS — R05.3 PERSISTENT COUGH: ICD-10-CM

## 2024-01-26 DIAGNOSIS — F03.918 UNSPECIFIED DEMENTIA, UNSPECIFIED SEVERITY, WITH OTHER BEHAVIORAL DISTURBANCE (HCC): Primary | ICD-10-CM

## 2024-01-26 DIAGNOSIS — N18.4 CHRONIC KIDNEY DISEASE, STAGE IV (SEVERE) (HCC): ICD-10-CM

## 2024-01-26 DIAGNOSIS — I10 HYPERTENSION, UNSPECIFIED TYPE: ICD-10-CM

## 2024-01-26 DIAGNOSIS — F41.9 ANXIETY: ICD-10-CM

## 2024-01-26 DIAGNOSIS — H35.30 MACULAR DEGENERATION OF LEFT EYE, UNSPECIFIED TYPE: ICD-10-CM

## 2024-01-26 DIAGNOSIS — F32.9 MAJOR DEPRESSIVE DISORDER, REMISSION STATUS UNSPECIFIED, UNSPECIFIED WHETHER RECURRENT: ICD-10-CM

## 2024-01-26 DIAGNOSIS — R25.1 TREMOR: ICD-10-CM

## 2024-01-26 DIAGNOSIS — M19.90 OSTEOARTHRITIS, UNSPECIFIED OSTEOARTHRITIS TYPE, UNSPECIFIED SITE: ICD-10-CM

## 2024-01-26 DIAGNOSIS — E11.9 TYPE 2 DIABETES MELLITUS WITHOUT COMPLICATION, UNSPECIFIED WHETHER LONG TERM INSULIN USE (HCC): ICD-10-CM

## 2024-01-26 DIAGNOSIS — Z91.81 HISTORY OF FALL: ICD-10-CM

## 2024-01-26 DIAGNOSIS — E78.00 HYPERCHOLESTEREMIA: ICD-10-CM

## 2024-01-26 PROCEDURE — 99309 SBSQ NF CARE MODERATE MDM 30: CPT | Performed by: FAMILY MEDICINE

## 2024-02-08 RX ORDER — CLONAZEPAM 1 MG/1
TABLET ORAL
Qty: 30 TABLET | Refills: 0 | OUTPATIENT
Start: 2024-02-08

## 2024-02-19 NOTE — PROGRESS NOTES
as needed for persistent wheezing.    Remainder of chronic health conditions stable and unchanged.  Plan as noted above. Will follow up for routine monthly rounds.  They will call sooner with any concerns prior.         Delfina DIANE, julio personally transcribing for Wil Trejo MD 2/19/24 at 12:15 PM EST.        Wil DIANE MD, personally performed the services described in this document as transcribed by the , and it is both accurate and complete.    Electronically signed by Wil Trejo MD on 3/11/24 at 2:55 PM EDT

## 2024-02-27 ENCOUNTER — OUTSIDE SERVICES (OUTPATIENT)
Dept: INTERNAL MEDICINE | Age: 79
End: 2024-02-27
Payer: MEDICARE

## 2024-02-27 DIAGNOSIS — E66.01 CLASS 2 SEVERE OBESITY DUE TO EXCESS CALORIES WITH SERIOUS COMORBIDITY AND BODY MASS INDEX (BMI) OF 37.0 TO 37.9 IN ADULT (HCC): ICD-10-CM

## 2024-02-27 DIAGNOSIS — F03.918 UNSPECIFIED DEMENTIA, UNSPECIFIED SEVERITY, WITH OTHER BEHAVIORAL DISTURBANCE (HCC): Primary | ICD-10-CM

## 2024-02-27 DIAGNOSIS — F25.9 SCHIZOAFFECTIVE DISORDER, UNSPECIFIED TYPE (HCC): ICD-10-CM

## 2024-02-27 DIAGNOSIS — N18.4 CHRONIC KIDNEY DISEASE, STAGE IV (SEVERE) (HCC): ICD-10-CM

## 2024-02-27 DIAGNOSIS — E11.9 TYPE 2 DIABETES MELLITUS WITHOUT COMPLICATION, UNSPECIFIED WHETHER LONG TERM INSULIN USE (HCC): ICD-10-CM

## 2024-02-27 PROCEDURE — 99308 SBSQ NF CARE LOW MDM 20: CPT | Performed by: NURSE PRACTITIONER

## 2024-02-27 RX ORDER — FLUTICASONE FUROATE, UMECLIDINIUM BROMIDE AND VILANTEROL TRIFENATATE 100; 62.5; 25 UG/1; UG/1; UG/1
1 POWDER RESPIRATORY (INHALATION) DAILY
COMMUNITY
Start: 2024-01-26

## 2024-02-28 ASSESSMENT — ENCOUNTER SYMPTOMS
CHEST TIGHTNESS: 0
DIARRHEA: 0
FACIAL SWELLING: 0
SORE THROAT: 0
VOMITING: 0
EYE PAIN: 0
RHINORRHEA: 0
COLOR CHANGE: 0
COUGH: 1
TROUBLE SWALLOWING: 0
WHEEZING: 0
SINUS PRESSURE: 0
CONSTIPATION: 0
SHORTNESS OF BREATH: 0
BLOOD IN STOOL: 0
NAUSEA: 0
ABDOMINAL PAIN: 0

## 2024-03-08 LAB
ANION GAP SERPL CALCULATED.3IONS-SCNC: 4.8 MMOL/L (ref 3–11)
BACTERIA, URINE: NORMAL /HPF
BILIRUBIN URINE: NEGATIVE
BLOOD, URINE: NEGATIVE
BUN BLDV-MCNC: 27 MG/DL (ref 7–17)
CALCIUM SERPL-MCNC: 8.9 MG/DL (ref 8.4–10.2)
CASTS UA: NORMAL /LPF
CHLORIDE BLD-SCNC: 99 MMOL/L (ref 98–120)
CLARITY: CLEAR
CO2: 35 MMOL/L (ref 22–31)
COLOR, URINE: YELLOW
CREAT SERPL-MCNC: 1.7 MG/DL (ref 0.5–1)
CRYSTALS, UA: NORMAL
GFR CALCULATED: 30.8
GLUCOSE URINE: NORMAL MG/DL
GLUCOSE: 346 MG/DL (ref 65–105)
HBA1C MFR BLD: 11.1 % (ref 4.4–6.4)
KETONES, URINE: NEGATIVE MG/DL
LEUKOCYTE ESTERASE, URINE: NEGATIVE
NITRITE, URINE: NEGATIVE
PH UA: 7 (ref 5–8.5)
POTASSIUM SERPL-SCNC: 3.8 MMOL/L (ref 3.6–5)
PROTEIN UA: NEGATIVE MG/DL
RBC URINE: NORMAL /HPF (ref 0–2)
SODIUM BLD-SCNC: 135 MMOL/L (ref 135–145)
SPECIFIC GRAVITY, URINE: 1.01 MG/DL (ref 1–1.03)
SQUAMOUS EPITHELIAL: NORMAL /HPF
UROBILINOGEN, URINE: 0.2 MG/DL (ref 0.2–1)
WBC URINE: NORMAL /HPF (ref 0–4)

## 2024-03-29 ENCOUNTER — OUTSIDE SERVICES (OUTPATIENT)
Dept: FAMILY MEDICINE CLINIC | Age: 79
End: 2024-03-29
Payer: MEDICARE

## 2024-03-29 DIAGNOSIS — E11.9 TYPE 2 DIABETES MELLITUS WITHOUT COMPLICATION, UNSPECIFIED WHETHER LONG TERM INSULIN USE (HCC): ICD-10-CM

## 2024-03-29 DIAGNOSIS — F41.9 ANXIETY: ICD-10-CM

## 2024-03-29 DIAGNOSIS — E78.00 HYPERCHOLESTEREMIA: ICD-10-CM

## 2024-03-29 DIAGNOSIS — R05.3 PERSISTENT COUGH: ICD-10-CM

## 2024-03-29 DIAGNOSIS — Z91.81 HISTORY OF FALL: ICD-10-CM

## 2024-03-29 DIAGNOSIS — M19.90 OSTEOARTHRITIS, UNSPECIFIED OSTEOARTHRITIS TYPE, UNSPECIFIED SITE: ICD-10-CM

## 2024-03-29 DIAGNOSIS — N18.4 CHRONIC KIDNEY DISEASE, STAGE IV (SEVERE) (HCC): ICD-10-CM

## 2024-03-29 DIAGNOSIS — H35.30 MACULAR DEGENERATION OF LEFT EYE, UNSPECIFIED TYPE: ICD-10-CM

## 2024-03-29 DIAGNOSIS — I10 HYPERTENSION, UNSPECIFIED TYPE: ICD-10-CM

## 2024-03-29 DIAGNOSIS — F03.918 UNSPECIFIED DEMENTIA, UNSPECIFIED SEVERITY, WITH OTHER BEHAVIORAL DISTURBANCE (HCC): Primary | ICD-10-CM

## 2024-03-29 DIAGNOSIS — F32.9 MAJOR DEPRESSIVE DISORDER, REMISSION STATUS UNSPECIFIED, UNSPECIFIED WHETHER RECURRENT: ICD-10-CM

## 2024-03-29 DIAGNOSIS — R25.1 TREMOR: ICD-10-CM

## 2024-03-29 PROCEDURE — 99309 SBSQ NF CARE MODERATE MDM 30: CPT | Performed by: FAMILY MEDICINE

## 2024-04-19 NOTE — PROGRESS NOTES
SACRAL NERVE STIMULATOR LEAD PLACEMENT N/A 3/11/2019    Interstim Sacral Nerve Implant Stage II performed by Arthur Rubio MD at [facility] OR       Social History     Socioeconomic History    Marital status:      Spouse name: Not on file    Number of children: Not on file    Years of education: Not on file    Highest education level: Not on file   Occupational History    Not on file   Tobacco Use    Smoking status: Never    Smokeless tobacco: Never   Substance and Sexual Activity    Alcohol use: Not Currently    Drug use: Never    Sexual activity: Not Currently     Partners: Male   Other Topics Concern    Not on file   Social History Narrative    Not on file     Social Determinants of Health     Financial Resource Strain: Not on file   Food Insecurity: Not on file   Transportation Needs: Not on file   Physical Activity: Not on file   Stress: Not on file   Social Connections: Not on file   Intimate Partner Violence: Not on file   Housing Stability: Not on file       Current Outpatient Medications   Medication Sig Dispense Refill    TRELEGY ELLIPTA 100-62.5-25 MCG/ACT AEPB inhaler Inhale 1 puff into the lungs daily      dextromethorphan-guaiFENesin (MUCINEX DM)  MG per extended release tablet Take 1 tablet by mouth every 12 hours      benzonatate (TESSALON) 100 MG capsule Take 1 capsule by mouth every 8 hours as needed for Cough      albuterol sulfate HFA (PROVENTIL;VENTOLIN;PROAIR) 108 (90 Base) MCG/ACT inhaler Inhale 2 puffs into the lungs every 8 hours as needed for Wheezing      Ascorbic Acid (VITAMIN C) 250 MG tablet Take 1 tablet by mouth daily      atorvastatin (LIPITOR) 10 MG tablet Take 1 tablet by mouth nightly      buPROPion (WELLBUTRIN XL) 150 MG extended release tablet Take 1 tablet by mouth every other day      busPIRone (BUSPAR) 5 MG tablet Take 1 tablet by mouth daily      CALCIUM-VITAMIN D PO Take 1 tablet by mouth Daily      vitamin D (CHOLECALCIFEROL) 25 MCG (1000 UT) TABS tablet Take 1

## 2024-04-23 ENCOUNTER — OUTSIDE SERVICES (OUTPATIENT)
Dept: INTERNAL MEDICINE | Age: 79
End: 2024-04-23
Payer: MEDICARE

## 2024-04-23 DIAGNOSIS — N18.4 CHRONIC KIDNEY DISEASE, STAGE IV (SEVERE) (HCC): ICD-10-CM

## 2024-04-23 DIAGNOSIS — F41.9 ANXIETY: ICD-10-CM

## 2024-04-23 DIAGNOSIS — E11.9 TYPE 2 DIABETES MELLITUS WITHOUT COMPLICATION, UNSPECIFIED WHETHER LONG TERM INSULIN USE (HCC): ICD-10-CM

## 2024-04-23 DIAGNOSIS — F25.9 SCHIZOAFFECTIVE DISORDER, UNSPECIFIED TYPE (HCC): ICD-10-CM

## 2024-04-23 DIAGNOSIS — F03.918 UNSPECIFIED DEMENTIA, UNSPECIFIED SEVERITY, WITH OTHER BEHAVIORAL DISTURBANCE (HCC): Primary | ICD-10-CM

## 2024-04-23 DIAGNOSIS — E66.01 CLASS 2 SEVERE OBESITY DUE TO EXCESS CALORIES WITH SERIOUS COMORBIDITY AND BODY MASS INDEX (BMI) OF 37.0 TO 37.9 IN ADULT (HCC): ICD-10-CM

## 2024-04-23 PROCEDURE — 99308 SBSQ NF CARE LOW MDM 20: CPT | Performed by: NURSE PRACTITIONER

## 2024-04-23 RX ORDER — INSULIN GLARGINE 100 [IU]/ML
30 INJECTION, SOLUTION SUBCUTANEOUS NIGHTLY
COMMUNITY

## 2024-04-23 NOTE — PROGRESS NOTES
6.2  Hemoglobin 10.3  Hematocrit 35.4  Platelets 193  Hemoglobin A1c 7.5           Immunization History   Administered Date(s) Administered    COVID-19, MODERNA BLUE border, Primary or Immunocompromised, (age 12y+), IM, 100 mcg/0.5mL 01/26/2021, 03/02/2021, 12/01/2021, 06/29/2022    COVID-19, MODERNA Bivalent, (age 12y+), IM, 50 mcg/0.5 mL 12/07/2022    COVID-19, MODERNA, (2023-24 formula), (age 12y+), IM, 50mcg/0.5mL 11/19/2023    DTaP, DAPTACEL, (age 6w-6y), IM, 0.5mL 08/27/2009    INFLUENZA, INTRADERMAL, QUADRIVALENT, PRESERVATIVE FREE 01/01/2018    Influenza A ([id number]) Vaccine PF IM 01/13/2010    Influenza Vaccine, unspecified formulation 11/01/2001, 11/21/2002, 12/11/2003, 12/12/2005    Influenza Virus Vaccine 09/26/2011, 10/07/2021, 10/01/2023    Influenza Whole 09/15/2009, 10/07/2010, 09/27/2012    Influenza, High Dose (Fluzone 65 yrs and older) 09/20/2016, 09/14/2017, 01/28/2020    Pneumococcal, PCV-13, PREVNAR 13, (age 6w+), IM, 0.5mL 09/20/2016    Pneumococcal, PPSV23, PNEUMOVAX 23, (age 2y+), SC/IM, 0.5mL 11/21/2002, 01/01/2007, 06/27/2014    Td vaccine (adult) 06/25/2001    Zoster Live (Zostavax) 12/01/2014       Allergies   Allergen Reactions    Sulfa Antibiotics Rash       Past Medical History:   Diagnosis Date    Anxiety     Chronic kidney disease, stage IV (severe) ([facility])     Cognitive communication deficit     Community acquired pneumonia of right lower lobe of lung 01/2023    Constipation     Dementia ([facility])     DMII (diabetes mellitus, type 2) ([facility])     Early stage dry age-related macular degeneration of left eye     Fatigue     Hyperlipemia     Hypertension     Hypertension     Hypoxia 01/2023    Major depression     Muscle weakness (generalized)     Osteoarthritis     Personal history of fall     Right knee pain     Schizoaffective disorder (HCC)     Sleep apnea     Tremor     Unspecified dementia, unspecified severity, with other behavioral disturbance (HCC)     Unsteadiness        Past Surgical

## 2024-04-25 ASSESSMENT — ENCOUNTER SYMPTOMS
CHEST TIGHTNESS: 0
VOMITING: 0
NAUSEA: 0
SORE THROAT: 0
EYE PAIN: 0
BLOOD IN STOOL: 0
TROUBLE SWALLOWING: 0
COLOR CHANGE: 0
ABDOMINAL PAIN: 0
SINUS PRESSURE: 0
SHORTNESS OF BREATH: 0
CONSTIPATION: 0
DIARRHEA: 0
COUGH: 0
RHINORRHEA: 0
FACIAL SWELLING: 0

## 2024-05-13 RX ORDER — CLONAZEPAM 1 MG/1
TABLET ORAL
Qty: 30 TABLET | Refills: 0 | OUTPATIENT
Start: 2024-05-13

## 2024-05-31 ENCOUNTER — OUTSIDE SERVICES (OUTPATIENT)
Dept: FAMILY MEDICINE CLINIC | Age: 79
End: 2024-05-31
Payer: MEDICARE

## 2024-05-31 DIAGNOSIS — E11.9 TYPE 2 DIABETES MELLITUS WITHOUT COMPLICATION, UNSPECIFIED WHETHER LONG TERM INSULIN USE (HCC): ICD-10-CM

## 2024-05-31 DIAGNOSIS — F32.9 MAJOR DEPRESSIVE DISORDER, REMISSION STATUS UNSPECIFIED, UNSPECIFIED WHETHER RECURRENT: ICD-10-CM

## 2024-05-31 DIAGNOSIS — R25.1 TREMOR: ICD-10-CM

## 2024-05-31 DIAGNOSIS — R05.3 PERSISTENT COUGH: ICD-10-CM

## 2024-05-31 DIAGNOSIS — Z91.81 HISTORY OF FALL: ICD-10-CM

## 2024-05-31 DIAGNOSIS — N18.4 CHRONIC KIDNEY DISEASE, STAGE IV (SEVERE) (HCC): ICD-10-CM

## 2024-05-31 DIAGNOSIS — F03.918 UNSPECIFIED DEMENTIA, UNSPECIFIED SEVERITY, WITH OTHER BEHAVIORAL DISTURBANCE (HCC): Primary | ICD-10-CM

## 2024-05-31 DIAGNOSIS — H35.30 MACULAR DEGENERATION OF LEFT EYE, UNSPECIFIED TYPE: ICD-10-CM

## 2024-05-31 DIAGNOSIS — I10 HYPERTENSION, UNSPECIFIED TYPE: ICD-10-CM

## 2024-05-31 DIAGNOSIS — E78.00 HYPERCHOLESTEREMIA: ICD-10-CM

## 2024-05-31 DIAGNOSIS — M19.90 OSTEOARTHRITIS, UNSPECIFIED OSTEOARTHRITIS TYPE, UNSPECIFIED SITE: ICD-10-CM

## 2024-05-31 DIAGNOSIS — F41.9 ANXIETY: ICD-10-CM

## 2024-05-31 PROCEDURE — 99309 SBSQ NF CARE MODERATE MDM 30: CPT | Performed by: FAMILY MEDICINE

## 2024-06-19 NOTE — PROGRESS NOTES
[facility]  Date of Service:  5/31/2024  Klarissa Ramos  Date of Birth 1945  MRN: <[id number]>  Code Status----FULL CODE         HPI:  78-year-old patient with progressive dementia being seen for routine monthly rounds at [facility].  Previous Dr. Romeo patient.  Being switched over to [facility] provider in the interim.  Patient pleasantly confused, resting in bed.  No family present.  History obtained from nursing staff and chart.  Patient's mood has been stable she is a long-term resident follows with psychiatry staff states she likes to tell everyone they are fired because she wants to go home.  Staff concerned as she has had worsening cough over the last 4 weeks.  Has used Mucinex and Tessalon with no improvement.  Patient states it is productive but she just swallows it.  Has been afebrile.  Staff deny any further acute concerns.  Has a good appetite no problems with bowel or bladder.      Over the interval:  [facility] Her  is present today in the room during the visit. Patient reports no acute concerns. She is feeling well overall. Patient denies any significant pain or bowel issues. Psychiatry has recently weaned her off of Risperdal by nursing report patient continues to do well without behavioral issues. She still declines to go to most activities and prefers to stay in her room.            LABS:  March 8, 2024   Sodium 135   Potassium 3.8   BUN 27   Creatinine 1.7   GFR 30.8   Glucose 346   HA1c 11.10%      12/7/2023  Sodium 141  Potassium 4  BUN 23  Creatinine 1.5  Glucose 171  AST 32  ALT 26  Triglycerides 125  Total cholesterol 126  LDL 65  B12  1000  Vitamin D 59.4  WBC 6.2  Hemoglobin 10.3  Hematocrit 35.4  Platelets 193  Hemoglobin A1c 7.5        Immunization History   Administered Date(s) Administered    COVID-19, MODERNA BLUE border, Primary or Immunocompromised, (age 12y+), IM, 100 mcg/0.5mL 01/26/2021,

## 2024-06-20 LAB
ALBUMIN/GLOBULIN RATIO: 1.07 G/DL
ALBUMIN: 3.1 G/DL (ref 3.5–5)
ALP BLD-CCNC: 99 UNITS/L (ref 38–126)
ALT SERPL-CCNC: 28 UNITS/L (ref 4–35)
ANION GAP SERPL CALCULATED.3IONS-SCNC: 9.9 MMOL/L (ref 3–11)
AST SERPL-CCNC: 35 UNITS/L (ref 14–36)
BASOPHILS ABSOLUTE: 0.06 X10E3/?L (ref 0–0.3)
BASOPHILS RELATIVE PERCENT: 0.84 % (ref 0–3)
BILIRUB SERPL-MCNC: 0.4 MG/DL (ref 0.2–1.3)
BUN BLDV-MCNC: 27 MG/DL (ref 7–17)
CALCIUM SERPL-MCNC: 8.7 MG/DL (ref 8.4–10.2)
CHLORIDE BLD-SCNC: 99 MMOL/L (ref 98–120)
CHOLESTEROL, TOTAL: 118 MG/DL (ref 50–200)
CHOLESTEROL/HDL RATIO: 3.69 RATIO (ref 0–4.5)
CO2: 34 MMOL/L (ref 22–31)
CREAT SERPL-MCNC: 1.7 MG/DL (ref 0.5–1)
EOSINOPHILS ABSOLUTE: 0.22 X10E3/?L (ref 0–1.1)
EOSINOPHILS RELATIVE PERCENT: 3.33 % (ref 0–10)
GFR, ESTIMATED: 30.8
GLOBULIN: 2.9 G/DL
GLUCOSE: 269 MG/DL (ref 65–105)
HBA1C MFR BLD: 9.4 % (ref 4.4–6.4)
HCT VFR BLD CALC: 36 % (ref 37–47)
HDLC SERPL-MCNC: 32 MG/DL (ref 36–68)
HEMOGLOBIN: 11 G/DL (ref 12–16)
LDL CHOLESTEROL: 56.8 MG/DL (ref 0–160)
LYMPHOCYTES ABSOLUTE: 1.68 X10E3/?L (ref 1–5.5)
LYMPHOCYTES RELATIVE PERCENT: 25.07 % (ref 20–51.1)
MCH RBC QN AUTO: 27.9 PG (ref 28.5–32.5)
MCHC RBC AUTO-ENTMCNC: 30.4 G/DL (ref 32–37)
MCV RBC AUTO: 91.5 FL (ref 80–94)
MONOCYTES ABSOLUTE: 0.73 X10E3/?L (ref 0.1–1)
MONOCYTES RELATIVE PERCENT: 10.85 % (ref 1.7–9.3)
NEUTROPHILS ABSOLUTE: 4.02 X10E3/?L (ref 2–8.1)
NEUTROPHILS RELATIVE PERCENT: 59.92 % (ref 42.2–75.2)
PDW BLD-RTO: 12.9 % (ref 8.5–15.5)
PLATELET # BLD: 181.8 THOU/MM3 (ref 130–400)
POTASSIUM SERPL-SCNC: 3.9 MMOL/L (ref 3.6–5)
RBC # BLD: 3.94 M/UL (ref 4.2–5.4)
SODIUM BLD-SCNC: 139 MMOL/L (ref 135–145)
TOTAL PROTEIN: 6 G/DL (ref 6.3–8.2)
TRIGL SERPL-MCNC: 146 MG/DL (ref 10–250)
VLDLC SERPL CALC-MCNC: 29 MG/DL (ref 0–50)
WBC # BLD: 6.7 THOU/ML3 (ref 4.8–10.8)

## 2024-06-25 ENCOUNTER — OUTSIDE SERVICES (OUTPATIENT)
Dept: INTERNAL MEDICINE | Age: 79
End: 2024-06-25
Payer: MEDICARE

## 2024-06-25 DIAGNOSIS — F25.9 SCHIZOAFFECTIVE DISORDER, UNSPECIFIED TYPE (HCC): ICD-10-CM

## 2024-06-25 DIAGNOSIS — E66.01 CLASS 2 SEVERE OBESITY DUE TO EXCESS CALORIES WITH SERIOUS COMORBIDITY AND BODY MASS INDEX (BMI) OF 37.0 TO 37.9 IN ADULT (HCC): ICD-10-CM

## 2024-06-25 DIAGNOSIS — N18.4 CHRONIC KIDNEY DISEASE, STAGE IV (SEVERE) (HCC): ICD-10-CM

## 2024-06-25 DIAGNOSIS — E11.9 TYPE 2 DIABETES MELLITUS WITHOUT COMPLICATION, UNSPECIFIED WHETHER LONG TERM INSULIN USE (HCC): ICD-10-CM

## 2024-06-25 DIAGNOSIS — F03.918 UNSPECIFIED DEMENTIA, UNSPECIFIED SEVERITY, WITH OTHER BEHAVIORAL DISTURBANCE (HCC): Primary | ICD-10-CM

## 2024-06-25 PROCEDURE — 99308 SBSQ NF CARE LOW MDM 20: CPT | Performed by: NURSE PRACTITIONER

## 2024-06-26 NOTE — PROGRESS NOTES
weaned out by psych services in late May 2024. Thus far she has been doing fairly well per staff. Wander guard is in place.  Depression with anxiety and schizophrenia disorder.  Medications as noted above, stable, follows with psych at [facility]. Risperdal recently reduced the once daily through psychiatry, still no behavioral issues at present.   Type 2 diabetes mellitus. Patient continues on Victoza and metformin as of March 8, 2024 her Victoza dose was increased to 1.2 milligrams. I think her Risperdal is currently being reduced and may be playing a role in her blood sugar issues.  Patient with persistent elevations in blood sugar.  Hemoglobin A1c improved however still elevated above 9.  Will titrate Lantus  Essential hypertension continues on lisinopril, metoprolol and hydrochlorothiazide.  Suggest getting routine labs as with chronic kidney disease may need to adjust blood pressure medications.  Tremor.  Osteoarthritis currently has naproxen on her medication list however with history of chronic kidney disease would advise against NSAIDs.  Needs follow-up blood work.  Chronic kidney disease stage IV.  Macular degeneration, left eye.  Hypercholesteremia continues on atorvastatin.  History of falls, continues on D.P.M. mattress to prevent falls, Dycem to recliner to prevent falls out of wheelchair.  Persistent cough.   She reports she smoked on for 10 years. She has not had any significant signs or symptoms of heart failure, confirmed by chest x-ray. She gets brief improvement with z-pack and the Prednisone burst. She has been treated with aerosols in the past. At this time, she does seem to have some active wheezing. she has albuterol and Atrovent for PRN use. It is not clear if she is really utilizing this or asking for it. I am going to start her on a Trelegy inhaler and see if we can cut down on the chronic wheezing and cough as well as mucus production. She can continue to use the Atrovent and albuterol as needed

## 2024-06-29 LAB
BACTERIA, URINE: NORMAL /HPF
BILIRUBIN, URINE: NEGATIVE
BLOOD, URINE: NEGATIVE
CASTS UA: NORMAL /LPF
CLARITY: CLEAR
COLOR: YELLOW
CRYSTALS, UA: NORMAL
GLUCOSE URINE: NEGATIVE MG/DL
KETONES, URINE: NEGATIVE MG/DL
LEUKOCYTE ESTERASE, URINE: NEGATIVE
NITRITE, URINE: NEGATIVE
PH, URINE: 6 (ref 5–8.5)
PROTEIN UA: NEGATIVE MG/DL
RBC URINE: NORMAL /HPF (ref 0–2)
SPECIFIC GRAVITY UA: 1.01 MG/DL (ref 1–1.03)
SQUAMOUS EPITHELIAL: NORMAL /HPF
UROBILINOGEN, URINE: 0.2 MG/DL (ref 0.2–1)
WBC URINE: NORMAL /HPF (ref 0–4)

## 2024-07-08 RX ORDER — INSULIN GLARGINE 100 [IU]/ML
40 INJECTION, SOLUTION SUBCUTANEOUS NIGHTLY
COMMUNITY

## 2024-07-26 ENCOUNTER — OUTSIDE SERVICES (OUTPATIENT)
Dept: FAMILY MEDICINE CLINIC | Age: 79
End: 2024-07-26

## 2024-07-26 DIAGNOSIS — I10 HYPERTENSION, UNSPECIFIED TYPE: ICD-10-CM

## 2024-07-26 DIAGNOSIS — M19.90 OSTEOARTHRITIS, UNSPECIFIED OSTEOARTHRITIS TYPE, UNSPECIFIED SITE: ICD-10-CM

## 2024-07-26 DIAGNOSIS — R05.3 PERSISTENT COUGH: ICD-10-CM

## 2024-07-26 DIAGNOSIS — F32.9 MAJOR DEPRESSIVE DISORDER, REMISSION STATUS UNSPECIFIED, UNSPECIFIED WHETHER RECURRENT: ICD-10-CM

## 2024-07-26 DIAGNOSIS — E11.9 TYPE 2 DIABETES MELLITUS WITHOUT COMPLICATION, UNSPECIFIED WHETHER LONG TERM INSULIN USE (HCC): ICD-10-CM

## 2024-07-26 DIAGNOSIS — E78.00 HYPERCHOLESTEREMIA: ICD-10-CM

## 2024-07-26 DIAGNOSIS — F03.918 UNSPECIFIED DEMENTIA, UNSPECIFIED SEVERITY, WITH OTHER BEHAVIORAL DISTURBANCE (HCC): Primary | ICD-10-CM

## 2024-07-26 DIAGNOSIS — Z91.81 HISTORY OF FALL: ICD-10-CM

## 2024-07-26 DIAGNOSIS — R25.1 TREMOR: ICD-10-CM

## 2024-07-26 DIAGNOSIS — H35.30 MACULAR DEGENERATION OF LEFT EYE, UNSPECIFIED TYPE: ICD-10-CM

## 2024-07-26 DIAGNOSIS — N18.4 CHRONIC KIDNEY DISEASE, STAGE IV (SEVERE) (HCC): ICD-10-CM

## 2024-07-30 LAB
BACTERIA, URINE: NORMAL /HPF
BILIRUBIN, URINE: NEGATIVE
BLOOD, URINE: NEGATIVE
CASTS UA: NORMAL /LPF
CLARITY, UA: NORMAL
COLOR, UA: YELLOW
CRYSTALS, UA: NORMAL
GLUCOSE URINE: NORMAL MG/DL
KETONES, URINE: NEGATIVE MG/DL
LEUKOCYTE ESTERASE, URINE: NEGATIVE
NITRITE, URINE: NEGATIVE
PH, URINE: 7 (ref 5–8.5)
PROTEIN UA: NEGATIVE MG/DL
RBC URINE: NORMAL /HPF (ref 0–2)
SPECIFIC GRAVITY UA: 1.01 MG/DL (ref 1–1.03)
SQUAMOUS EPITHELIAL: NORMAL /HPF
UROBILINOGEN, URINE: 0.2 MG/DL (ref 0.2–1)
WBC URINE: NORMAL /HPF (ref 0–4)

## 2024-07-30 NOTE — PROGRESS NOTES
07/30/2024 3-5  /HPF Final    Casts UA 07/30/2024 Absent  Absent /LPF Final    Crystals, UA 07/30/2024 Absent  Absent Final    WBC, UA 07/30/2024 0-4  0 - 4 /HPF Final    RBC, UA 07/30/2024 Absent  0 - 2 /HPF Final    BACTERIA, URINE 07/30/2024 Trace  /HPF Final         ASSESSMENT/PLAN:  Dementia, stable does have intermittent irritability episodes continues to follow with psychiatry at [facility] continues on bupropion 150 mg every other day, BuSpar 5 mg daily, clonazepam at bedtime, Aricept, Namenda, Remeron, trazodone. Her Risperdal was weaned out by psych services in late May 2024. Thus far she has been doing fairly well per staff. Wander guard is in place.  Depression with anxiety and schizophrenia disorder. Medications as noted above, stable, follows with psych at [facility]. Risperdal recently reduced the once daily through psychiatry, still no behavioral issues at present.   Type 2 diabetes mellitus. Victoza discontinued. Risperdal may be adding to some high sugars and weight gain. Her Lantus is now Basaglar which is being titrated upward this month. Trulicity dose was added once weekly. Will continue follow up with serial A1cs.   Essential hypertension continues on lisinopril, metoprolol and hydrochlorothiazide. Suggest getting routine labs as with chronic kidney disease may need to adjust blood pressure medications.  Tremor.  Osteoarthritis currently has naproxen on her medication list however with history of chronic kidney disease would advise against NSAIDs. Needs follow-up blood work.  Chronic kidney disease stage IV.  Macular degeneration left eye.  Hypercholesteremia continues on atorvastatin.  History of falls, continues on D.P.M. mattress to prevent falls, Dycem to recliner to prevent falls out of wheelchair.  Persistent cough. She reports she smoked on for 10 years. She has not had any significant signs or symptoms of heart failure, confirmed by chest x-ray. She gets brief improvement with z-pack and the

## 2024-08-27 ENCOUNTER — OUTSIDE SERVICES (OUTPATIENT)
Dept: INTERNAL MEDICINE | Age: 79
End: 2024-08-27
Payer: MEDICARE

## 2024-08-27 DIAGNOSIS — N18.4 CHRONIC KIDNEY DISEASE, STAGE IV (SEVERE) (HCC): ICD-10-CM

## 2024-08-27 DIAGNOSIS — L30.9 DERMATITIS: ICD-10-CM

## 2024-08-27 DIAGNOSIS — F25.9 SCHIZOAFFECTIVE DISORDER, UNSPECIFIED TYPE (HCC): ICD-10-CM

## 2024-08-27 DIAGNOSIS — F03.918 UNSPECIFIED DEMENTIA, UNSPECIFIED SEVERITY, WITH OTHER BEHAVIORAL DISTURBANCE (HCC): Primary | ICD-10-CM

## 2024-08-27 DIAGNOSIS — E11.9 TYPE 2 DIABETES MELLITUS WITHOUT COMPLICATION, UNSPECIFIED WHETHER LONG TERM INSULIN USE (HCC): ICD-10-CM

## 2024-08-27 PROCEDURE — 99308 SBSQ NF CARE LOW MDM 20: CPT | Performed by: NURSE PRACTITIONER

## 2024-08-28 NOTE — PROGRESS NOTES
memantine ER (NAMENDA XR) 14 MG CP24 extended release capsule Take 1 capsule by mouth daily      METAMUCIL FIBER PO Take 0.4 g by mouth daily      metFORMIN (GLUCOPHAGE) 500 MG tablet Take 0.5 tablets by mouth daily      metoprolol tartrate (LOPRESSOR) 25 MG tablet Take 0.5 tablets by mouth 2 times daily      magnesium hydroxide (MILK OF MAGNESIA) 400 MG/5ML suspension Take 30 mLs by mouth daily as needed for Constipation      mirtazapine (REMERON) 7.5 MG tablet Take 1 tablet by mouth daily      Multiple Vitamin (MULTIVITAMIN ADULT PO) Take 1 tablet by mouth daily      Naproxen Sodium 220 MG CAPS Take 1 capsule by mouth daily as needed (back and neck pain)      acetaminophen (TYLENOL) 325 MG tablet Take 2 tablets by mouth every 6 hours as needed for Pain or Fever Limit total APAP dose to <3000mg/24 hours      clopidogrel (PLAVIX) 75 MG tablet Take 1 tablet by mouth daily      clonazePAM (KLONOPIN) 1 MG tablet Take 1 tablet by mouth nightly.      donepezil (ARICEPT) 5 MG tablet TAKE 1 TABLET BY MOUTH AT BEDTIME      traZODone (DESYREL) 50 MG tablet Take 1 tablet by mouth daily       No current facility-administered medications on file prior to visit.       Review of Systems   Reason unable to perform ROS: Patient with mild dementia.  Only concerns is lesion to right temple.  Does have lesions to bilateral forearms that are scabbed over.  Patient states she has been a chronic .  States that if she does not pick them off they will never go away.        Physical Exam  Vitals and nursing note reviewed.   Constitutional:       General: She is not in acute distress.     Appearance: Normal appearance. She is well-developed. She is not diaphoretic.   HENT:      Head: Normocephalic and atraumatic.      Right Ear: External ear normal.      Left Ear: External ear normal.   Eyes:      General:         Right eye: No discharge.         Left eye: No discharge.   Neck:      Trachea: No tracheal deviation.      Electronically signed by JENIFER Steiner CNP on 8/27/2024 at 8:30 PM

## 2024-09-13 ENCOUNTER — OUTSIDE SERVICES (OUTPATIENT)
Dept: FAMILY MEDICINE CLINIC | Age: 79
End: 2024-09-13

## 2024-09-13 DIAGNOSIS — E11.9 TYPE 2 DIABETES MELLITUS WITHOUT COMPLICATION, UNSPECIFIED WHETHER LONG TERM INSULIN USE (HCC): ICD-10-CM

## 2024-09-13 DIAGNOSIS — E78.00 HYPERCHOLESTEREMIA: ICD-10-CM

## 2024-09-13 DIAGNOSIS — Z91.81 HISTORY OF FALL: ICD-10-CM

## 2024-09-13 DIAGNOSIS — F03.918 UNSPECIFIED DEMENTIA, UNSPECIFIED SEVERITY, WITH OTHER BEHAVIORAL DISTURBANCE (HCC): Primary | ICD-10-CM

## 2024-09-13 DIAGNOSIS — I10 HYPERTENSION, UNSPECIFIED TYPE: ICD-10-CM

## 2024-09-13 DIAGNOSIS — H35.30 MACULAR DEGENERATION OF LEFT EYE, UNSPECIFIED TYPE: ICD-10-CM

## 2024-09-13 DIAGNOSIS — R05.3 PERSISTENT COUGH: ICD-10-CM

## 2024-09-13 DIAGNOSIS — M19.90 OSTEOARTHRITIS, UNSPECIFIED OSTEOARTHRITIS TYPE, UNSPECIFIED SITE: ICD-10-CM

## 2024-09-13 DIAGNOSIS — R25.1 TREMOR: ICD-10-CM

## 2024-09-13 DIAGNOSIS — F32.9 MAJOR DEPRESSIVE DISORDER, REMISSION STATUS UNSPECIFIED, UNSPECIFIED WHETHER RECURRENT: ICD-10-CM

## 2024-09-13 DIAGNOSIS — N18.4 CHRONIC KIDNEY DISEASE, STAGE IV (SEVERE) (HCC): ICD-10-CM

## 2024-09-17 RX ORDER — DOCUSATE SODIUM 100 MG/1
100 CAPSULE, LIQUID FILLED ORAL 2 TIMES DAILY
COMMUNITY

## 2024-10-08 ENCOUNTER — OUTSIDE SERVICES (OUTPATIENT)
Dept: INTERNAL MEDICINE | Age: 79
End: 2024-10-08

## 2024-10-08 DIAGNOSIS — F25.9 SCHIZOAFFECTIVE DISORDER, UNSPECIFIED TYPE (HCC): ICD-10-CM

## 2024-10-08 DIAGNOSIS — N18.4 CHRONIC KIDNEY DISEASE, STAGE IV (SEVERE) (HCC): ICD-10-CM

## 2024-10-08 DIAGNOSIS — E11.9 TYPE 2 DIABETES MELLITUS WITHOUT COMPLICATION, UNSPECIFIED WHETHER LONG TERM INSULIN USE (HCC): ICD-10-CM

## 2024-10-08 DIAGNOSIS — F03.918 UNSPECIFIED DEMENTIA, UNSPECIFIED SEVERITY, WITH OTHER BEHAVIORAL DISTURBANCE (HCC): Primary | ICD-10-CM

## 2024-10-08 DIAGNOSIS — E66.01 CLASS 2 SEVERE OBESITY DUE TO EXCESS CALORIES WITH SERIOUS COMORBIDITY AND BODY MASS INDEX (BMI) OF 37.0 TO 37.9 IN ADULT: ICD-10-CM

## 2024-10-08 DIAGNOSIS — E66.812 CLASS 2 SEVERE OBESITY DUE TO EXCESS CALORIES WITH SERIOUS COMORBIDITY AND BODY MASS INDEX (BMI) OF 37.0 TO 37.9 IN ADULT: ICD-10-CM

## 2024-10-08 NOTE — PROGRESS NOTES
[facility]  Date of Service: 10/08/24  Klarissa Ramos  Date of Birth 1945  Code Status----FULL CODE          HPI:  78-year-old patient with progressive dementia being seen for routine monthly rounds at [facility].  Previous Dr. Romeo patient.  Being switched over to [facility] provider in the interim.  Patient pleasantly confused, resting in bed.  No family present.  History obtained from nursing staff and chart.  Patient's mood has been stable she is a long-term resident follows with psychiatry staff states she likes to tell everyone they are fired because she wants to go home.  Staff concerned as she has had worsening cough over the last 4 weeks.  Has used Mucinex and Tessalon with no improvement.  Patient states it is productive but she just swallows it.  Has been afebrile.  Staff deny any further acute concerns.  Has a good appetite no problems with bowel or bladder.      Over the interval:  Patient being seen for routine monthly rounds.  Patient asleep in bed.  States the rash has resolved.  Itching resolved.  Has had a good appetite.  Last month was seen and had concerns of a cough that has all resolved.  She denies any shortness of breath.  Does continue on oxygen.  Does require the assistance of staff for most ADLs.  Vital signs have been stable.  Nursing staff deny any acute nursing service issues.      LABS:  6/20/2024  Hemoglobin A1c 9.4      Triglycerides 146  Total cholesterol 118  LDL 56.8  HDL 32  Sodium 139  Potassium 3.9  BUN 27  Creatinine 1.7  Glucose 269  Calcium 8.7  AST 35  ALT 28  WBC 6.7  Hemoglobin 11  Hematocrit 36.0  Platelets 181.8     March 8, 2024   Sodium 135   Potassium 3.8   BUN 27   Creatinine 1.7   GFR 30.8   Glucose 346   HA1c 11.10%      12/7/2023  Sodium 141  Potassium 4  BUN 23  Creatinine 1.5  Glucose 171  AST 32  ALT 26  Triglycerides 125  Total cholesterol 126  LDL 65  B12  1000  Vitamin D 59.4  WBC 6.2  Hemoglobin 10.3  Hematocrit 35.4  Platelets

## 2024-10-11 RX ORDER — CLONAZEPAM 1 MG/1
TABLET ORAL
Qty: 60 TABLET | Refills: 0 | OUTPATIENT
Start: 2024-10-11

## 2024-11-08 DIAGNOSIS — F25.9 SCHIZOAFFECTIVE DISORDER, UNSPECIFIED TYPE (HCC): ICD-10-CM

## 2024-11-08 DIAGNOSIS — F41.9 ANXIETY: Primary | ICD-10-CM

## 2024-11-09 RX ORDER — CLONAZEPAM 1 MG/1
TABLET ORAL
Qty: 30 TABLET | Refills: 0 | Status: SHIPPED | OUTPATIENT
Start: 2024-11-09 | End: 2024-12-09

## 2024-11-15 ENCOUNTER — OUTSIDE SERVICES (OUTPATIENT)
Dept: FAMILY MEDICINE CLINIC | Age: 79
End: 2024-11-15

## 2024-11-15 DIAGNOSIS — R25.1 TREMOR: ICD-10-CM

## 2024-11-15 DIAGNOSIS — M19.90 OSTEOARTHRITIS, UNSPECIFIED OSTEOARTHRITIS TYPE, UNSPECIFIED SITE: ICD-10-CM

## 2024-11-15 DIAGNOSIS — I10 HYPERTENSION, UNSPECIFIED TYPE: ICD-10-CM

## 2024-11-15 DIAGNOSIS — F03.918 UNSPECIFIED DEMENTIA, UNSPECIFIED SEVERITY, WITH OTHER BEHAVIORAL DISTURBANCE (HCC): Primary | ICD-10-CM

## 2024-11-15 DIAGNOSIS — N18.4 CHRONIC KIDNEY DISEASE, STAGE IV (SEVERE) (HCC): ICD-10-CM

## 2024-11-15 DIAGNOSIS — R05.3 PERSISTENT COUGH: ICD-10-CM

## 2024-11-15 DIAGNOSIS — Z91.81 HISTORY OF FALL: ICD-10-CM

## 2024-11-15 DIAGNOSIS — F32.9 MAJOR DEPRESSIVE DISORDER, REMISSION STATUS UNSPECIFIED, UNSPECIFIED WHETHER RECURRENT: ICD-10-CM

## 2024-11-15 DIAGNOSIS — H35.30 MACULAR DEGENERATION OF LEFT EYE, UNSPECIFIED TYPE: ICD-10-CM

## 2024-11-15 DIAGNOSIS — E78.00 HYPERCHOLESTEREMIA: ICD-10-CM

## 2024-11-15 DIAGNOSIS — E11.9 TYPE 2 DIABETES MELLITUS WITHOUT COMPLICATION, UNSPECIFIED WHETHER LONG TERM INSULIN USE (HCC): ICD-10-CM

## 2024-11-18 LAB
BACTERIA, URINE: ABNORMAL /HPF
BILIRUBIN, URINE: NEGATIVE
BLOOD, URINE: NEGATIVE
CASTS UA: ABNORMAL /LPF
CLARITY, UA: ABNORMAL
COLOR, UA: YELLOW
CRYSTALS, UA: ABNORMAL
GLUCOSE URINE: ABNORMAL MG/DL
KETONES, URINE: NEGATIVE MG/DL
LEUKOCYTE ESTERASE, URINE: NEGATIVE
NITRITE, URINE: NEGATIVE
PH, URINE: 6 (ref 5–8.5)
PROTEIN UA: NEGATIVE MG/DL
RBC URINE: ABNORMAL /HPF (ref 0–2)
SPECIFIC GRAVITY UA: 1.01 MG/DL (ref 1–1.03)
SQUAMOUS EPITHELIAL: ABNORMAL /HPF
UROBILINOGEN, URINE: 0.2 MG/DL (ref 0.2–1)
WBC URINE: ABNORMAL /HPF (ref 0–4)

## 2024-11-18 NOTE — PROGRESS NOTES
present.   Type 2 diabetes mellitus. Victoza discontinued. Risperdal may be adding to some high sugars and weight gain. Her Lantus is now Basaglar which is being titrated upward this month. Trulicity dose was added once weekly. Will continue follow up with serial A1cs.   Essential hypertension continues on lisinopril, metoprolol and hydrochlorothiazide. Suggest getting routine labs as with chronic kidney disease may need to adjust blood pressure medications.  Tremor.  Osteoarthritis currently has naproxen on her medication list however with history of chronic kidney disease would advise against NSAIDs. Needs follow-up blood work.  Chronic kidney disease stage IV.  Macular degeneration left eye.  Hypercholesteremia continues on atorvastatin.  History of falls, continues on D.P.M. mattress to prevent falls, Dycem to recliner to prevent falls out of wheelchair.  Persistent cough. She reports she smoked on for 10 years before quitting. She has never been diagnosed with asthma or COPD. She does not seem to have any significant heart failure or volume overload. She gets brief improvement with z-pack and the Prednisone burst. She has been treated with aerosols in the past. At this time, she does seem to have some active wheezing. Trelegy inhaler was started at prior visit, recommend continuing that daily and continue Atrovent and albuterol as needed. She denies aspiration. Continue on the Trelegy over the next interval. Cough resolved.    Remainder of chronic health conditions stable and unchanged.  Plan as noted above. Will follow up for routine monthly rounds.  They will call sooner with any concerns prior.         Elaine DIANE, am personally transcribing for Wil Trejo MD 11/18/24 at 1:06 PM EST.        Wil DIANE MD, personally performed the services described in this document as transcribed by the , and it is both accurate and complete.    Electronically signed by Wil Trejo MD on

## 2024-12-05 LAB
ALBUMIN/GLOBULIN RATIO: 1.11 G/DL
ALBUMIN: 3.1 G/DL (ref 3.5–5)
ALP BLD-CCNC: 102 UNITS/L (ref 38–126)
ALT SERPL-CCNC: 28 UNITS/L (ref 4–35)
ANION GAP SERPL CALCULATED.3IONS-SCNC: 3.9 MMOL/L (ref 3–11)
AST SERPL-CCNC: 36 UNITS/L (ref 14–36)
BASOPHILS ABSOLUTE: 0.07 X10E3/?L (ref 0–0.3)
BASOPHILS RELATIVE PERCENT: 1.11 % (ref 0–3)
BILIRUB SERPL-MCNC: 0.7 MG/DL (ref 0.2–1.3)
BUN BLDV-MCNC: 22 MG/DL (ref 7–17)
CALCIUM SERPL-MCNC: 8.3 MG/DL (ref 8.4–10.2)
CHLORIDE BLD-SCNC: 101 MMOL/L (ref 98–120)
CHOLESTEROL, TOTAL: 109 MG/DL (ref 50–200)
CHOLESTEROL/HDL RATIO: 3.21 RATIO (ref 0–4.5)
CO2: 35 MMOL/L (ref 22–31)
CREAT SERPL-MCNC: 1.6 MG/DL (ref 0.5–1)
EOSINOPHILS ABSOLUTE: 0.36 X10E3/?L (ref 0–1.1)
EOSINOPHILS RELATIVE PERCENT: 5.37 % (ref 0–10)
GFR, ESTIMATED: 33
GLOBULIN: 2.8 G/DL
GLUCOSE: 212 MG/DL (ref 65–105)
HBA1C MFR BLD: 10.7 % (ref 4.4–6.4)
HCT VFR BLD CALC: 36.6 % (ref 37–47)
HDLC SERPL-MCNC: 34 MG/DL (ref 36–68)
HEMOGLOBIN: 11.6 G/DL (ref 12–16)
LDL CHOLESTEROL: 54.2 MG/DL (ref 0–160)
LYMPHOCYTES ABSOLUTE: 1.66 X10E3/?L (ref 1–5.5)
LYMPHOCYTES RELATIVE PERCENT: 24.76 % (ref 20–51.1)
MCH RBC QN AUTO: 28.1 PG (ref 28.5–32.5)
MCHC RBC AUTO-ENTMCNC: 31.6 G/DL (ref 32–37)
MCV RBC AUTO: 88.7 FL (ref 80–94)
MONOCYTES ABSOLUTE: 0.85 X10E3/?L (ref 0.1–1)
MONOCYTES RELATIVE PERCENT: 12.59 % (ref 1.7–9.3)
NEUTROPHILS ABSOLUTE: 3.77 X10E3/?L (ref 2–8.1)
NEUTROPHILS RELATIVE PERCENT: 56.16 % (ref 42.2–75.2)
PDW BLD-RTO: 13.1 % (ref 8.5–15.5)
PLATELET # BLD: 222.7 THOU/MM3 (ref 130–400)
POTASSIUM SERPL-SCNC: 3.9 MMOL/L (ref 3.6–5)
RBC # BLD: 4.13 M/UL (ref 4.2–5.4)
SODIUM BLD-SCNC: 136 MMOL/L (ref 135–145)
TOTAL PROTEIN: 5.9 G/DL (ref 6.3–8.2)
TRIGL SERPL-MCNC: 104 MG/DL (ref 10–250)
VITAMIN B-12: > 1000 PG/ML (ref 239–931)
VITAMIN D 25-HYDROXY: 72.7 NG/ML (ref 30–100)
VLDLC SERPL CALC-MCNC: 21 MG/DL (ref 0–50)
WBC # BLD: 6.7 THOU/ML3 (ref 4.8–10.8)

## 2024-12-10 ENCOUNTER — OUTSIDE SERVICES (OUTPATIENT)
Dept: INTERNAL MEDICINE | Age: 79
End: 2024-12-10

## 2024-12-10 DIAGNOSIS — N18.4 CHRONIC KIDNEY DISEASE, STAGE IV (SEVERE) (HCC): ICD-10-CM

## 2024-12-10 DIAGNOSIS — F25.9 SCHIZOAFFECTIVE DISORDER, UNSPECIFIED TYPE (HCC): ICD-10-CM

## 2024-12-10 DIAGNOSIS — E11.9 TYPE 2 DIABETES MELLITUS WITHOUT COMPLICATION, UNSPECIFIED WHETHER LONG TERM INSULIN USE (HCC): ICD-10-CM

## 2024-12-10 DIAGNOSIS — E66.01 CLASS 2 SEVERE OBESITY DUE TO EXCESS CALORIES WITH SERIOUS COMORBIDITY AND BODY MASS INDEX (BMI) OF 37.0 TO 37.9 IN ADULT: ICD-10-CM

## 2024-12-10 DIAGNOSIS — F03.918 UNSPECIFIED DEMENTIA, UNSPECIFIED SEVERITY, WITH OTHER BEHAVIORAL DISTURBANCE (HCC): Primary | ICD-10-CM

## 2024-12-10 DIAGNOSIS — E66.812 CLASS 2 SEVERE OBESITY DUE TO EXCESS CALORIES WITH SERIOUS COMORBIDITY AND BODY MASS INDEX (BMI) OF 37.0 TO 37.9 IN ADULT: ICD-10-CM

## 2024-12-10 DIAGNOSIS — R05.3 CHRONIC COUGH: ICD-10-CM

## 2024-12-10 NOTE — PROGRESS NOTES
continues to follow with psychiatry at [facility] continues on bupropion 150 mg every other day, BuSpar 5 mg daily, clonazepam at bedtime, Aricept, Namenda, Remeron, trazodone. Her Risperdal was weaned out by psych services in late May 2024. Thus far she has been doing fairly well per staff. Wander guard is in place. She has intermittent aggravation, irritability issues. She is followed jointly with Psych. She is currently reporting that she is feeling some of her medications were weaned back and her anxiety agitation is a lot worse. This may be her missing the Risperdal dosing. She is back to picking at her skin, requesting more attention to her anxiety. I am going to increase her Buspar to 3 times a day dosing and we will go from there if she is not improved.   Depression with anxiety and schizophrenia disorder. Medications as noted above, stable, follows with psych at [facility]. Risperdal recently reduced the once daily through psychiatry, still no behavioral issues at present.   Type 2 diabetes mellitus. Victoza discontinued. Risperdal may be adding to some high sugars and weight gain. Her Lantus is now Basaglar which is being titrated upward this month. Trulicity dose was added once weekly. Will continue follow up with serial A1cs.   Essential hypertension continues on lisinopril, metoprolol and hydrochlorothiazide. Suggest getting routine labs as with chronic kidney disease may need to adjust blood pressure medications.  Tremor.  Osteoarthritis currently has naproxen on her medication list however with history of chronic kidney disease would advise against NSAIDs. Needs follow-up blood work.  Chronic kidney disease stage IV.  Macular degeneration left eye.  Hypercholesteremia continues on atorvastatin.  History of falls, continues on D.P.M. mattress to prevent falls, Dycem to recliner to prevent falls out of wheelchair.  Persistent cough. She reports she smoked on for 10 years before quitting. She has never been diagnosed

## 2025-01-17 ENCOUNTER — OUTSIDE SERVICES (OUTPATIENT)
Dept: FAMILY MEDICINE CLINIC | Age: 80
End: 2025-01-17
Payer: MEDICARE

## 2025-01-17 DIAGNOSIS — F03.918 UNSPECIFIED DEMENTIA, UNSPECIFIED SEVERITY, WITH OTHER BEHAVIORAL DISTURBANCE (HCC): Primary | ICD-10-CM

## 2025-01-17 DIAGNOSIS — E11.9 TYPE 2 DIABETES MELLITUS WITHOUT COMPLICATION, UNSPECIFIED WHETHER LONG TERM INSULIN USE (HCC): ICD-10-CM

## 2025-01-17 DIAGNOSIS — I10 HYPERTENSION, UNSPECIFIED TYPE: ICD-10-CM

## 2025-01-17 DIAGNOSIS — Z91.81 HISTORY OF FALL: ICD-10-CM

## 2025-01-17 DIAGNOSIS — H35.30 MACULAR DEGENERATION OF LEFT EYE, UNSPECIFIED TYPE: ICD-10-CM

## 2025-01-17 DIAGNOSIS — F32.9 MAJOR DEPRESSIVE DISORDER, REMISSION STATUS UNSPECIFIED, UNSPECIFIED WHETHER RECURRENT: ICD-10-CM

## 2025-01-17 DIAGNOSIS — R05.3 PERSISTENT COUGH: ICD-10-CM

## 2025-01-17 DIAGNOSIS — N18.4 CHRONIC KIDNEY DISEASE, STAGE IV (SEVERE) (HCC): ICD-10-CM

## 2025-01-17 DIAGNOSIS — R25.1 TREMOR: ICD-10-CM

## 2025-01-17 DIAGNOSIS — F41.9 ANXIETY: ICD-10-CM

## 2025-01-17 DIAGNOSIS — F25.9 SCHIZOAFFECTIVE DISORDER, UNSPECIFIED TYPE (HCC): ICD-10-CM

## 2025-01-17 DIAGNOSIS — M19.90 OSTEOARTHRITIS, UNSPECIFIED OSTEOARTHRITIS TYPE, UNSPECIFIED SITE: ICD-10-CM

## 2025-01-17 DIAGNOSIS — E78.00 HYPERCHOLESTEREMIA: ICD-10-CM

## 2025-01-17 PROCEDURE — 99309 SBSQ NF CARE MODERATE MDM 30: CPT | Performed by: FAMILY MEDICINE

## 2025-03-04 NOTE — PROGRESS NOTES
overload. She gets brief improvement with z-pack and the Prednisone burst. She has been treated with aerosols in the past. At this time, she does seem to have some active wheezing. Trelegy inhaler was started at prior visit, recommend continuing that daily and continue Atrovent and albuterol as needed. She denies aspiration. Continue on the Trelegy over the next interval. She does seem to be making some improvement at present and will continue to monitor.     Remainder of chronic health conditions stable and unchanged.  Plan as noted above. Will follow up for routine monthly rounds.  They will call sooner with any concerns prior.         Elaine DIANE, am personally transcribing for Wil Trejo MD 3/4/25 at 11:56 AM EST.        Wil DIANE MD, personally performed the services described in this document as transcribed by the , and it is both accurate and complete.    Electronically signed by Wil Trejo MD on 3/5/25 at 5:17 PM EST

## 2025-03-26 LAB
BACTERIA, URINE: ABNORMAL /HPF
BILIRUBIN, URINE: NEGATIVE
BLOOD, URINE: NEGATIVE
CASTS UA: ABNORMAL /LPF
CLARITY, UA: ABNORMAL
COLOR, UA: YELLOW
CRYSTALS, UA: ABNORMAL
GLUCOSE URINE: ABNORMAL MG/DL
KETONES, URINE: NEGATIVE MG/DL
LEUKOCYTE ESTERASE, URINE: ABNORMAL
NITRITE, URINE: POSITIVE
PH, URINE: 5.5 (ref 5–8.5)
PROTEIN UA: NEGATIVE MG/DL
RBC URINE: ABNORMAL /HPF (ref 0–2)
SPECIFIC GRAVITY UA: 1.01 E.U./DL (ref 1–1.03)
SQUAMOUS EPITHELIAL: ABNORMAL /HPF
UROBILINOGEN, URINE: 0.2 MG/DL (ref 0.2–1)
WBC URINE: ABNORMAL /HPF (ref 0–4)

## 2025-03-27 ENCOUNTER — RESULTS FOLLOW-UP (OUTPATIENT)
Dept: FAMILY MEDICINE CLINIC | Age: 80
End: 2025-03-27

## (undated) DEVICE — 3M™ IOBAN™ 2 ANTIMICROBIAL INCISE DRAPE 6650EZ: Brand: IOBAN™ 2

## (undated) DEVICE — CHLORAPREP 26ML ORANGE

## (undated) DEVICE — Device: Brand: AIR-CHARGED SINGLE SENSOR CATHETER

## (undated) DEVICE — ELECTRODE EMG GEL PTCH W/ WIRE NEOTRODE II URODYN

## (undated) DEVICE — Z DISCONTINUED USE 2651424 COVER EQUIP D18IN CNTOUR ELAS BND SNUG CLSR

## (undated) DEVICE — INTRODUCER NEUROSTIMULATOR LD FOR URIN CTRL INTERSTIM

## (undated) DEVICE — COVER PRB W14XL147CM TELESCOPICALLY FLD EXT LEN CIV-FLEX

## (undated) DEVICE — SUTURE VCRL SZ 3-0 L27IN ABSRB VLT L26MM SH 1/2 CIR J316H

## (undated) DEVICE — CATHETER URODYN AIR CHARGED ABD SENSOR

## (undated) DEVICE — 9165 UNIVERSAL PATIENT PLATE: Brand: 3M™

## (undated) DEVICE — SUTURE MCRYL SZ 4-0 L18IN ABSRB UD L16MM PC-3 3/8 CIR PRIM Y845G

## (undated) DEVICE — PACK PROCEDURE SURG BASIN CUST

## (undated) DEVICE — SWABSTICK MEDICATED 10% POVIDONE IOD PVP SGL ANTISEP SAT

## (undated) DEVICE — DRESSING TRNSPAR W5XL4.5IN FLM SHT SEMIPERMEABLE WIND

## (undated) DEVICE — GLOVE ORANGE PI 7 1/2   MSG9075

## (undated) DEVICE — DRAPE,REIN 53X77,STERILE: Brand: MEDLINE

## (undated) DEVICE — C-ARMOR C-ARM EQUIPMENT COVERS CLEAR STERILE UNIVERSAL FIT 12 PER CASE: Brand: C-ARMOR

## (undated) DEVICE — KIT HDSET COMM SMRT PRGMR GU PROX US INTERSTIM

## (undated) DEVICE — DRESSING TRNSPAR W4XL10IN FLM MIC POR SURESITE 123

## (undated) DEVICE — SUTURE PROL SZ 0 L30IN NONABSORBABLE BLU L36MM CT-1 1/2 CIR 8424H

## (undated) DEVICE — GAUZE,SPONGE,4"X4",12PLY,STERILE,LF,2'S: Brand: MEDLINE

## (undated) DEVICE — SOLUTION IV IRRIG WATER 1000ML POUR BRL 2F7114

## (undated) DEVICE — GLOVE SURG SZ 7 L12IN THK7.5MIL DK GRN LTX FREE MSG6570] MEDLINE INDUSTRIES INC]

## (undated) DEVICE — SKIN AFFIX SURG ADHESIVE 72/CS 0.55ML: Brand: MEDLINE

## (undated) DEVICE — CABLE NEUROSTIMULATOR TST STIM INTERSTIM

## (undated) DEVICE — PACK,LAPAROTOMY,PK IV,AURORA: Brand: MEDLINE

## (undated) DEVICE — Z DUP USE 2522782 SOLUTION IRRIG 1000ML STRL H2O PLAS CONTAINER UROMATIC

## (undated) DEVICE — GLOVE SURG SZ 6 THK91MIL LTX FREE SYN POLYISOPRENE ANTI

## (undated) DEVICE — PACK PROCEDURE SURG CYSTO SVMMC LF

## (undated) DEVICE — STRIP,CLOSURE,WOUND,MEDI-STRIP,1/2X4: Brand: MEDLINE

## (undated) DEVICE — Device: Brand: PUMP TUBING INFUSION LINE

## (undated) DEVICE — CATHETER,URETHRAL,REDRUBBER,STRL,14FR: Brand: MEDLINE INDUSTRIES, INC.